# Patient Record
Sex: FEMALE | Race: WHITE | Employment: FULL TIME | URBAN - METROPOLITAN AREA
[De-identification: names, ages, dates, MRNs, and addresses within clinical notes are randomized per-mention and may not be internally consistent; named-entity substitution may affect disease eponyms.]

---

## 2022-10-10 ENCOUNTER — APPOINTMENT (OUTPATIENT)
Dept: GENERAL RADIOLOGY | Age: 22
End: 2022-10-10
Payer: COMMERCIAL

## 2022-10-10 ENCOUNTER — HOSPITAL ENCOUNTER (EMERGENCY)
Age: 22
Discharge: HOME | End: 2022-10-10
Attending: STUDENT IN AN ORGANIZED HEALTH CARE EDUCATION/TRAINING PROGRAM
Payer: COMMERCIAL

## 2022-10-10 VITALS
RESPIRATION RATE: 16 BRPM | TEMPERATURE: 98.3 F | OXYGEN SATURATION: 99 % | DIASTOLIC BLOOD PRESSURE: 88 MMHG | SYSTOLIC BLOOD PRESSURE: 150 MMHG | HEIGHT: 60 IN | BODY MASS INDEX: 24.54 KG/M2 | HEART RATE: 121 BPM | WEIGHT: 125 LBS

## 2022-10-10 DIAGNOSIS — F41.1 ANXIETY STATE: ICD-10-CM

## 2022-10-10 DIAGNOSIS — R07.89 ATYPICAL CHEST PAIN: Primary | ICD-10-CM

## 2022-10-10 LAB
ALBUMIN SERPL-MCNC: 5.1 G/DL (ref 3.5–4.6)
ALP BLD-CCNC: 41 U/L (ref 40–130)
ALT SERPL-CCNC: 27 U/L (ref 0–33)
ANION GAP SERPL CALCULATED.3IONS-SCNC: 13 MEQ/L (ref 9–15)
AST SERPL-CCNC: 24 U/L (ref 0–35)
BACTERIA: ABNORMAL /HPF
BASOPHILS ABSOLUTE: 0 K/UL (ref 0–0.2)
BASOPHILS RELATIVE PERCENT: 0.6 %
BILIRUB SERPL-MCNC: 0.6 MG/DL (ref 0.2–0.7)
BILIRUBIN URINE: NEGATIVE
BLOOD, URINE: NEGATIVE
BUN BLDV-MCNC: 6 MG/DL (ref 6–20)
CALCIUM SERPL-MCNC: 9.2 MG/DL (ref 8.5–9.9)
CHLORIDE BLD-SCNC: 102 MEQ/L (ref 95–107)
CHP ED QC CHECK: NORMAL
CLARITY: CLEAR
CO2: 23 MEQ/L (ref 20–31)
COLOR: YELLOW
CREAT SERPL-MCNC: 0.59 MG/DL (ref 0.5–0.9)
D DIMER: <0.27 MG/L FEU (ref 0–0.5)
EOSINOPHILS ABSOLUTE: 0.1 K/UL (ref 0–0.7)
EOSINOPHILS RELATIVE PERCENT: 1.4 %
EPITHELIAL CELLS, UA: ABNORMAL /HPF (ref 0–5)
GFR AFRICAN AMERICAN: >60
GFR NON-AFRICAN AMERICAN: >60
GLOBULIN: 2.9 G/DL (ref 2.3–3.5)
GLUCOSE BLD-MCNC: 90 MG/DL (ref 70–99)
GLUCOSE URINE: NEGATIVE MG/DL
HCT VFR BLD CALC: 39.6 % (ref 37–47)
HEMOGLOBIN: 13.5 G/DL (ref 12–16)
HYALINE CASTS: ABNORMAL /HPF (ref 0–5)
KETONES, URINE: 15 MG/DL
LACTIC ACID: 1.4 MMOL/L (ref 0.5–2.2)
LEUKOCYTE ESTERASE, URINE: ABNORMAL
LIPASE: 32 U/L (ref 12–95)
LYMPHOCYTES ABSOLUTE: 1.4 K/UL (ref 1–4.8)
LYMPHOCYTES RELATIVE PERCENT: 31.7 %
MAGNESIUM: 1.8 MG/DL (ref 1.7–2.4)
MCH RBC QN AUTO: 29.8 PG (ref 27–31.3)
MCHC RBC AUTO-ENTMCNC: 34 % (ref 33–37)
MCV RBC AUTO: 87.6 FL (ref 82–100)
MONOCYTES ABSOLUTE: 0.3 K/UL (ref 0.2–0.8)
MONOCYTES RELATIVE PERCENT: 6 %
NEUTROPHILS ABSOLUTE: 2.7 K/UL (ref 1.4–6.5)
NEUTROPHILS RELATIVE PERCENT: 60.3 %
NITRITE, URINE: NEGATIVE
PDW BLD-RTO: 12.8 % (ref 11.5–14.5)
PH UA: 7.5 (ref 5–9)
PLATELET # BLD: 273 K/UL (ref 130–400)
POTASSIUM SERPL-SCNC: 3.7 MEQ/L (ref 3.4–4.9)
PREGNANCY TEST URINE, POC: NEGATIVE
PROTEIN UA: NEGATIVE MG/DL
RBC # BLD: 4.52 M/UL (ref 4.2–5.4)
RBC UA: ABNORMAL /HPF (ref 0–5)
SARS-COV-2, NAAT: NOT DETECTED
SODIUM BLD-SCNC: 138 MEQ/L (ref 135–144)
SPECIFIC GRAVITY UA: 1.01 (ref 1–1.03)
TOTAL CK: 68 U/L (ref 0–170)
TOTAL PROTEIN: 8 G/DL (ref 6.3–8)
TROPONIN: <0.01 NG/ML (ref 0–0.01)
TSH REFLEX: 2.35 UIU/ML (ref 0.44–3.86)
URINE REFLEX TO CULTURE: YES
UROBILINOGEN, URINE: 0.2 E.U./DL
WBC # BLD: 4.5 K/UL (ref 4.8–10.8)
WBC UA: ABNORMAL /HPF (ref 0–5)

## 2022-10-10 PROCEDURE — 36415 COLL VENOUS BLD VENIPUNCTURE: CPT

## 2022-10-10 PROCEDURE — 71045 X-RAY EXAM CHEST 1 VIEW: CPT

## 2022-10-10 PROCEDURE — 96361 HYDRATE IV INFUSION ADD-ON: CPT

## 2022-10-10 PROCEDURE — 2500000003 HC RX 250 WO HCPCS: Performed by: STUDENT IN AN ORGANIZED HEALTH CARE EDUCATION/TRAINING PROGRAM

## 2022-10-10 PROCEDURE — 6370000000 HC RX 637 (ALT 250 FOR IP): Performed by: STUDENT IN AN ORGANIZED HEALTH CARE EDUCATION/TRAINING PROGRAM

## 2022-10-10 PROCEDURE — 81001 URINALYSIS AUTO W/SCOPE: CPT

## 2022-10-10 PROCEDURE — 2580000003 HC RX 258: Performed by: STUDENT IN AN ORGANIZED HEALTH CARE EDUCATION/TRAINING PROGRAM

## 2022-10-10 PROCEDURE — 83605 ASSAY OF LACTIC ACID: CPT

## 2022-10-10 PROCEDURE — 84484 ASSAY OF TROPONIN QUANT: CPT

## 2022-10-10 PROCEDURE — 83735 ASSAY OF MAGNESIUM: CPT

## 2022-10-10 PROCEDURE — 99285 EMERGENCY DEPT VISIT HI MDM: CPT

## 2022-10-10 PROCEDURE — 84443 ASSAY THYROID STIM HORMONE: CPT

## 2022-10-10 PROCEDURE — 80053 COMPREHEN METABOLIC PANEL: CPT

## 2022-10-10 PROCEDURE — 83690 ASSAY OF LIPASE: CPT

## 2022-10-10 PROCEDURE — A4216 STERILE WATER/SALINE, 10 ML: HCPCS | Performed by: STUDENT IN AN ORGANIZED HEALTH CARE EDUCATION/TRAINING PROGRAM

## 2022-10-10 PROCEDURE — 96374 THER/PROPH/DIAG INJ IV PUSH: CPT

## 2022-10-10 PROCEDURE — 85379 FIBRIN DEGRADATION QUANT: CPT

## 2022-10-10 PROCEDURE — 82550 ASSAY OF CK (CPK): CPT

## 2022-10-10 PROCEDURE — 85025 COMPLETE CBC W/AUTO DIFF WBC: CPT

## 2022-10-10 PROCEDURE — 87635 SARS-COV-2 COVID-19 AMP PRB: CPT

## 2022-10-10 PROCEDURE — 87086 URINE CULTURE/COLONY COUNT: CPT

## 2022-10-10 PROCEDURE — 86403 PARTICLE AGGLUT ANTBDY SCRN: CPT

## 2022-10-10 PROCEDURE — 93005 ELECTROCARDIOGRAM TRACING: CPT | Performed by: STUDENT IN AN ORGANIZED HEALTH CARE EDUCATION/TRAINING PROGRAM

## 2022-10-10 RX ORDER — 0.9 % SODIUM CHLORIDE 0.9 %
1000 INTRAVENOUS SOLUTION INTRAVENOUS ONCE
Status: COMPLETED | OUTPATIENT
Start: 2022-10-10 | End: 2022-10-10

## 2022-10-10 RX ORDER — ACETAMINOPHEN 500 MG
1000 TABLET ORAL ONCE
Status: COMPLETED | OUTPATIENT
Start: 2022-10-10 | End: 2022-10-10

## 2022-10-10 RX ADMIN — FAMOTIDINE 20 MG: 10 INJECTION, SOLUTION INTRAVENOUS at 10:58

## 2022-10-10 RX ADMIN — SODIUM CHLORIDE 1000 ML: 9 INJECTION, SOLUTION INTRAVENOUS at 11:03

## 2022-10-10 RX ADMIN — LIDOCAINE HYDROCHLORIDE: 20 SOLUTION ORAL; TOPICAL at 10:58

## 2022-10-10 RX ADMIN — ACETAMINOPHEN 1000 MG: 500 TABLET ORAL at 10:57

## 2022-10-10 ASSESSMENT — ENCOUNTER SYMPTOMS
VOMITING: 0
STRIDOR: 0
ABDOMINAL PAIN: 0
SHORTNESS OF BREATH: 1
NAUSEA: 0
WHEEZING: 0
COUGH: 0
EYE PAIN: 0
CHEST TIGHTNESS: 1
BACK PAIN: 1
SORE THROAT: 0
RHINORRHEA: 0
DIARRHEA: 0

## 2022-10-10 ASSESSMENT — PAIN DESCRIPTION - LOCATION: LOCATION: CHEST

## 2022-10-10 ASSESSMENT — PAIN SCALES - GENERAL: PAINLEVEL_OUTOF10: 4

## 2022-10-10 ASSESSMENT — PAIN - FUNCTIONAL ASSESSMENT
PAIN_FUNCTIONAL_ASSESSMENT: ACTIVITIES ARE NOT PREVENTED
PAIN_FUNCTIONAL_ASSESSMENT: 0-10

## 2022-10-10 ASSESSMENT — PAIN DESCRIPTION - FREQUENCY: FREQUENCY: CONTINUOUS

## 2022-10-10 ASSESSMENT — PAIN DESCRIPTION - DESCRIPTORS: DESCRIPTORS: TIGHTNESS;BURNING

## 2022-10-10 ASSESSMENT — PAIN DESCRIPTION - ONSET: ONSET: ON-GOING

## 2022-10-10 ASSESSMENT — PAIN DESCRIPTION - PAIN TYPE: TYPE: ACUTE PAIN

## 2022-10-10 NOTE — ED PROVIDER NOTES
[facility] ED  eMERGENCY dEPARTMENT eNCOUnter      Pt Name: Damian Redd  MRN: [id number]  [place] 2000  Date of evaluation: 10/10/2022  Provider: Araceli Dakin, MD      HISTORY OF PRESENT ILLNESS      Chief Complaint   Patient presents with    Chest Pain     Pt c/o chest pain/tightness that started last night       The history is provided by the Patient. Damian Redd is a 20 y.o. female with a PMH clinically significant for anxiety, ADHD presenting to the ED via EMS c/o chest pain/tightness associated with shortness of breath, fatigue and feeling anxious. Initial onset of symptoms at 5 PM last night. Was just sitting on her couch when symptoms started. Denies any significant preceding exertional activities, trauma or abnormal ingestions. Was putting some objects on high shelves yesterday, but not very strenuous. Characterizes pain as burning, tightness sensation in the sternum. It does seem to also have pain in the back. Denies tearing sensation of pain radiating to the back. States symptoms do seem to be worse with sitting forward. No relief with ibuprofen, antacids taken at home. States that the pain has been constant since initial onset. Does seem to be mildly worse with taking a deep breath. No worsening with cough or palpation. Denies any similar previous episodes. States she does have a history of anxiety and has had issues where anxiety has caused chest pain, but not exactly similar to this. States she is otherwise been feeling well. May be a little bit more fatigued than usual, but no recent illnesses. Does have heavy menses that are regular. Is on oral contraception. Denies any associated F/C/D, sore throat, cough, hemoptysis, abdominal pain, N/V/D/C, urinary symptoms, BLE edema/pain. Denies any history of CAD, DVT/PE or arrhythmias in her or the family. States they have otherwise been feeling well. Taking all medications as indicated.     Per Chart Review: No ill-appearing, toxic-appearing or diaphoretic. HENT:      Head: Normocephalic and atraumatic. Mouth/Throat:      Mouth: Mucous membranes are moist.      Pharynx: Oropharynx is clear. Eyes:      Extraocular Movements: Extraocular movements intact. Pupils: Pupils are equal, round, and reactive to light. Neck:      Thyroid: No thyroid mass, thyromegaly or thyroid tenderness. Trachea: Trachea normal.   Cardiovascular:      Rate and Rhythm: Regular rhythm. Tachycardia present. Pulses: Normal pulses. Heart sounds: Normal heart sounds. Pulmonary:      Effort: Pulmonary effort is normal. No respiratory distress. Breath sounds: Normal breath sounds. Chest:      Chest wall: No deformity, tenderness or crepitus. Abdominal:      General: There is no distension. Palpations: Abdomen is soft. Tenderness: There is no abdominal tenderness. There is no right CVA tenderness, left CVA tenderness, guarding or rebound. Musculoskeletal:         General: No tenderness. Cervical back: Normal range of motion and neck supple. Right lower leg: No edema. Left lower leg: No edema. Skin:     General: Skin is warm and dry. Capillary Refill: Capillary refill takes less than 2 seconds. Neurological:      General: No focal deficit present. Mental Status: She is alert and oriented to person, place, and time. Psychiatric:         Mood and Affect: Mood is anxious.          Behavior: Behavior normal.       MDM:   Chart Reviewed: PMH and additional information as noted in HPI obtained from chart review    Vitals:    Vitals:    10/10/22 1017   BP: (!) 150/88   Pulse: (!) 121   Resp: 16   Temp: 98.3 °F (36.8 °C)   TempSrc: Oral   SpO2: 99%   Weight: 125 lb (56.7 kg)   Height: 5' (1.524 m)       PROCEDURES:  Unless otherwise noted below, none  Procedures    LABS:  Labs Reviewed   CULTURE, URINE - Abnormal; Notable for the following components:       Result Value    Urine Culture, Routine   (*)     Value: Performed at [facility], [address] [place] [address]  [phone]      Organism BHS Group B (Strep agalacticae) (*)     All other components within normal limits    Narrative:     ORDER#: [id number]                          ORDERED BY: CLAUDETTE ROBLES  SOURCE: Urine Clean Catch                  COLLECTED:  10/10/22 10:45  ANTIBIOTICS AT [facility].:                      RECEIVED :  10/10/22 11:16   COMPREHENSIVE METABOLIC PANEL - Abnormal; Notable for the following components:    Albumin 5.1 (*)     All other components within normal limits   CBC WITH AUTO DIFFERENTIAL - Abnormal; Notable for the following components:    WBC 4.5 (*)     All other components within normal limits   URINALYSIS WITH REFLEX TO CULTURE - Abnormal; Notable for the following components:    Ketones, Urine 15 (*)     Leukocyte Esterase, Urine LARGE (*)     All other components within normal limits   MICROSCOPIC URINALYSIS - Abnormal; Notable for the following components:    Bacteria, UA RARE (*)     WBC, UA 10-20 (*)     RBC, UA 3-5 (*)     All other components within normal limits   POCT URINE PREGNANCY - Normal   COVID-19, RAPID   LACTIC ACID   MAGNESIUM   LIPASE   TROPONIN   CK   D-DIMER, QUANTITATIVE   TSH WITH REFLEX       XR CHEST PORTABLE   Final Result   No acute process. ED Course as of 10/12/22 1819   Mon Oct 10, 2022   1127 XR CHEST PORTABLE [NA]   1127 EKG 12 Lead  Sinus tachycardia, rate of 136 bpm.  RAD. Normal intervals. No gross acute ST-T wave abnormalities. [NA]   3118 D-Dimer, Quant: <0.27  Low suspicion for PE/DVT [NA]   1157 TSH: 2.350 [NA]   1158 SARS-CoV-2, NAAT: Not Detected [NA]   1158 Pregnancy, Urine: negative [NA]   1158 Troponin: <0.010  Low suspicion for ACS [NA]   1158 Total CK: 68 [NA]   1158 Lipase: 32  Very low suspicion for pancreatitis.  [NA]   1158 Magnesium: 1.8 [NA]   1158 Comprehensive Metabolic Panel(!):    Sodium 138   Potassium 3.7   Chloride 102 CO2 23   Anion Gap 13   Glucose, Random 90   BUN,BUNPL 6   Creatinine 0.59   GFR Non- >60.0   GFR  >60.0   CALCIUM, SERUM, 110215 9.2   Total Protein 8.0   Albumin 5.1(!)   Bilirubin 0.6   Alk Phos 41   ALT 27   AST 24   Globulin 2.9 [NA]   1158 Lactic Acid: 1.4  Low suspicion for hypoperfusion. [NA]   1158 Epithelial Cells, UA: 6-10  Possibly contaminated and no urinary symptoms. Lower suspicion for UTI [NA]      ED Course User Index  [NA] Debbie Mark MD       20 y.o. female with a PMH clinically significant for anxiety, ADHD presenting to the ED via EMS c/o chest pain/tightness associated with shortness of breath, fatigue and feeling anxious. Upon initial evaluation, Pt tachycardic and anxious appearing, but otherwise Afebrile, HDS and in NAD. PE as noted above. Labs, , EKG,, and Imaging as noted above. Given findings, clinical presentation most likely consistent w/ chest pain thought most likely secondary to anxiety versus musculoskeletal chest pain. Although considered, low suspicion for DVT/PE given negative D-dimer with low pretest probability. Also with low suspicion for ACS, minimal risk factors, nonexertional atypical troponin. No evidence of widened mediastinum on chest x-ray. Symptoms significantly improved in the ED. Heart rate also improved prior to discharge. Strongly encourage patient to follow-up with PCP and return to the ED if any new or worsening symptoms.    Pt was administered   Medications   0.9 % sodium chloride bolus (0 mLs IntraVENous Stopped 10/10/22 1232)   aluminum & magnesium hydroxide-simethicone (MAALOX) 30 mL, lidocaine viscous hcl (XYLOCAINE) 5 mL (GI COCKTAIL) ( Oral Given 10/10/22 1058)   famotidine (PEPCID) 20 mg in sodium chloride (PF) 10 mL injection (20 mg IntraVENous Given 10/10/22 1058)   acetaminophen (TYLENOL) tablet 1,000 mg (1,000 mg Oral Given 10/10/22 1057)       Plan: Discharge home in good condition with meds as noted below and instructions to follow up with PCP . Pt stable and appropriate for further evaluation and management as an outpatient. and Patient understanding and amenable to the POC. CRITICAL CARE TIME   Total CriticalCare time was 0 minutes, excluding separately reportable procedures. There was a high probability of clinically significant/life threatening deterioration in the patient's condition which required my urgent intervention. FINAL IMPRESSION      1. Atypical chest pain    2. Anxiety state          DISPOSITION/PLAN   DISPOSITION Decision To Discharge 10/10/2022 12:19:23 PM      There are no discharge medications for this patient.        MD Dayday Biggs MD  10/12/22 Ariel Zuleta

## 2022-10-10 NOTE — ED TRIAGE NOTES
Pt c/o chest pain/tightness since last night, Pt is A&OX3, calm, ambulatory, afebrile, breathes are equal and unlabored,

## 2022-10-11 LAB
ORGANISM: ABNORMAL
URINE CULTURE, ROUTINE: ABNORMAL
URINE CULTURE, ROUTINE: ABNORMAL

## 2022-10-13 LAB
EKG ATRIAL RATE: 136 BPM
EKG P AXIS: 52 DEGREES
EKG P-R INTERVAL: 134 MS
EKG Q-T INTERVAL: 302 MS
EKG QRS DURATION: 82 MS
EKG QTC CALCULATION (BAZETT): 454 MS
EKG R AXIS: 113 DEGREES
EKG T AXIS: 37 DEGREES
EKG VENTRICULAR RATE: 136 BPM

## 2022-10-25 SDOH — HEALTH STABILITY: PHYSICAL HEALTH: ON AVERAGE, HOW MANY DAYS PER WEEK DO YOU ENGAGE IN MODERATE TO STRENUOUS EXERCISE (LIKE A BRISK WALK)?: 2 DAYS

## 2022-10-25 SDOH — HEALTH STABILITY: PHYSICAL HEALTH: ON AVERAGE, HOW MANY MINUTES DO YOU ENGAGE IN EXERCISE AT THIS LEVEL?: 30 MIN

## 2022-10-28 ENCOUNTER — OFFICE VISIT (OUTPATIENT)
Dept: FAMILY MEDICINE CLINIC | Age: 22
End: 2022-10-28
Payer: COMMERCIAL

## 2022-10-28 VITALS
HEIGHT: 60 IN | TEMPERATURE: 98.6 F | OXYGEN SATURATION: 99 % | SYSTOLIC BLOOD PRESSURE: 124 MMHG | HEART RATE: 111 BPM | DIASTOLIC BLOOD PRESSURE: 85 MMHG | BODY MASS INDEX: 22.34 KG/M2 | WEIGHT: 113.8 LBS

## 2022-10-28 DIAGNOSIS — N92.0 MENORRHAGIA WITH REGULAR CYCLE: ICD-10-CM

## 2022-10-28 DIAGNOSIS — R53.82 CHRONIC FATIGUE: ICD-10-CM

## 2022-10-28 DIAGNOSIS — Z23 NEED FOR INFLUENZA VACCINATION: ICD-10-CM

## 2022-10-28 DIAGNOSIS — Z13.220 ENCOUNTER FOR LIPID SCREENING FOR CARDIOVASCULAR DISEASE: ICD-10-CM

## 2022-10-28 DIAGNOSIS — Z13.6 ENCOUNTER FOR LIPID SCREENING FOR CARDIOVASCULAR DISEASE: ICD-10-CM

## 2022-10-28 DIAGNOSIS — Z76.89 ENCOUNTER TO ESTABLISH CARE: Primary | ICD-10-CM

## 2022-10-28 LAB
ALBUMIN SERPL-MCNC: 4.5 G/DL (ref 3.5–4.6)
ALP BLD-CCNC: 39 U/L (ref 40–130)
ALT SERPL-CCNC: 75 U/L (ref 0–33)
ANION GAP SERPL CALCULATED.3IONS-SCNC: 15 MEQ/L (ref 9–15)
AST SERPL-CCNC: 37 U/L (ref 0–35)
BASOPHILS ABSOLUTE: 0 K/UL (ref 0–0.2)
BASOPHILS RELATIVE PERCENT: 0.7 %
BILIRUB SERPL-MCNC: 0.5 MG/DL (ref 0.2–0.7)
BUN BLDV-MCNC: 7 MG/DL (ref 6–20)
CALCIUM SERPL-MCNC: 9.4 MG/DL (ref 8.5–9.9)
CHLORIDE BLD-SCNC: 103 MEQ/L (ref 95–107)
CHOLESTEROL, FASTING: 190 MG/DL (ref 0–199)
CO2: 22 MEQ/L (ref 20–31)
CREAT SERPL-MCNC: 0.54 MG/DL (ref 0.5–0.9)
EOSINOPHILS ABSOLUTE: 0.1 K/UL (ref 0–0.7)
EOSINOPHILS RELATIVE PERCENT: 2.1 %
GFR SERPL CREATININE-BSD FRML MDRD: >60 ML/MIN/{1.73_M2}
GLOBULIN: 3.2 G/DL (ref 2.3–3.5)
GLUCOSE BLD-MCNC: 87 MG/DL (ref 70–99)
HCT VFR BLD CALC: 40.7 % (ref 37–47)
HDLC SERPL-MCNC: 46 MG/DL (ref 40–59)
HEMOGLOBIN: 13.9 G/DL (ref 12–16)
LDL CHOLESTEROL CALCULATED: 126 MG/DL (ref 0–129)
LYMPHOCYTES ABSOLUTE: 1.7 K/UL (ref 1–4.8)
LYMPHOCYTES RELATIVE PERCENT: 34.6 %
MCH RBC QN AUTO: 30.3 PG (ref 27–31.3)
MCHC RBC AUTO-ENTMCNC: 34.2 % (ref 33–37)
MCV RBC AUTO: 88.7 FL (ref 79.4–94.8)
MONOCYTES ABSOLUTE: 0.3 K/UL (ref 0.2–0.8)
MONOCYTES RELATIVE PERCENT: 6.9 %
NEUTROPHILS ABSOLUTE: 2.8 K/UL (ref 1.4–6.5)
NEUTROPHILS RELATIVE PERCENT: 55.7 %
PDW BLD-RTO: 13.2 % (ref 11.5–14.5)
PLATELET # BLD: 316 K/UL (ref 130–400)
POTASSIUM SERPL-SCNC: 4.4 MEQ/L (ref 3.4–4.9)
RBC # BLD: 4.59 M/UL (ref 4.2–5.4)
SODIUM BLD-SCNC: 140 MEQ/L (ref 135–144)
TOTAL PROTEIN: 7.7 G/DL (ref 6.3–8)
TRIGLYCERIDE, FASTING: 90 MG/DL (ref 0–150)
WBC # BLD: 5 K/UL (ref 4.8–10.8)

## 2022-10-28 PROCEDURE — 1036F TOBACCO NON-USER: CPT | Performed by: STUDENT IN AN ORGANIZED HEALTH CARE EDUCATION/TRAINING PROGRAM

## 2022-10-28 PROCEDURE — G8427 DOCREV CUR MEDS BY ELIG CLIN: HCPCS | Performed by: STUDENT IN AN ORGANIZED HEALTH CARE EDUCATION/TRAINING PROGRAM

## 2022-10-28 PROCEDURE — 99203 OFFICE O/P NEW LOW 30 MIN: CPT | Performed by: STUDENT IN AN ORGANIZED HEALTH CARE EDUCATION/TRAINING PROGRAM

## 2022-10-28 PROCEDURE — G8482 FLU IMMUNIZE ORDER/ADMIN: HCPCS | Performed by: STUDENT IN AN ORGANIZED HEALTH CARE EDUCATION/TRAINING PROGRAM

## 2022-10-28 PROCEDURE — 90674 CCIIV4 VAC NO PRSV 0.5 ML IM: CPT | Performed by: STUDENT IN AN ORGANIZED HEALTH CARE EDUCATION/TRAINING PROGRAM

## 2022-10-28 PROCEDURE — G8420 CALC BMI NORM PARAMETERS: HCPCS | Performed by: STUDENT IN AN ORGANIZED HEALTH CARE EDUCATION/TRAINING PROGRAM

## 2022-10-28 PROCEDURE — 90471 IMMUNIZATION ADMIN: CPT | Performed by: STUDENT IN AN ORGANIZED HEALTH CARE EDUCATION/TRAINING PROGRAM

## 2022-10-28 RX ORDER — GABAPENTIN 300 MG/1
CAPSULE ORAL
COMMUNITY
Start: 2022-10-03

## 2022-10-28 RX ORDER — CHOLECALCIFEROL (VITAMIN D3) 50 MCG
TABLET ORAL
COMMUNITY

## 2022-10-28 RX ORDER — NORETHINDRONE ACETATE AND ETHINYL ESTRADIOL 1; .02 MG/1; MG/1
TABLET ORAL
COMMUNITY
End: 2022-10-28 | Stop reason: SDUPTHER

## 2022-10-28 RX ORDER — DEXTROAMPHETAMINE SACCHARATE, AMPHETAMINE ASPARTATE MONOHYDRATE, DEXTROAMPHETAMINE SULFATE AND AMPHETAMINE SULFATE 6.25; 6.25; 6.25; 6.25 MG/1; MG/1; MG/1; MG/1
CAPSULE, EXTENDED RELEASE ORAL
COMMUNITY
Start: 2022-10-03

## 2022-10-28 RX ORDER — NORETHINDRONE ACETATE AND ETHINYL ESTRADIOL 1; .02 MG/1; MG/1
1 TABLET ORAL DAILY
Qty: 3 PACKET | Refills: 3 | Status: SHIPPED | OUTPATIENT
Start: 2022-10-28

## 2022-10-28 SDOH — ECONOMIC STABILITY: TRANSPORTATION INSECURITY
IN THE PAST 12 MONTHS, HAS LACK OF TRANSPORTATION KEPT YOU FROM MEETINGS, WORK, OR FROM GETTING THINGS NEEDED FOR DAILY LIVING?: NO

## 2022-10-28 SDOH — ECONOMIC STABILITY: FOOD INSECURITY: WITHIN THE PAST 12 MONTHS, YOU WORRIED THAT YOUR FOOD WOULD RUN OUT BEFORE YOU GOT MONEY TO BUY MORE.: NEVER TRUE

## 2022-10-28 SDOH — ECONOMIC STABILITY: TRANSPORTATION INSECURITY
IN THE PAST 12 MONTHS, HAS THE LACK OF TRANSPORTATION KEPT YOU FROM MEDICAL APPOINTMENTS OR FROM GETTING MEDICATIONS?: NO

## 2022-10-28 SDOH — ECONOMIC STABILITY: FOOD INSECURITY: WITHIN THE PAST 12 MONTHS, THE FOOD YOU BOUGHT JUST DIDN'T LAST AND YOU DIDN'T HAVE MONEY TO GET MORE.: NEVER TRUE

## 2022-10-28 ASSESSMENT — PATIENT HEALTH QUESTIONNAIRE - PHQ9
9. THOUGHTS THAT YOU WOULD BE BETTER OFF DEAD, OR OF HURTING YOURSELF: 0
SUM OF ALL RESPONSES TO PHQ QUESTIONS 1-9: 13
SUM OF ALL RESPONSES TO PHQ QUESTIONS 1-9: 13
3. TROUBLE FALLING OR STAYING ASLEEP: 1
5. POOR APPETITE OR OVEREATING: 1
7. TROUBLE CONCENTRATING ON THINGS, SUCH AS READING THE NEWSPAPER OR WATCHING TELEVISION: 3
2. FEELING DOWN, DEPRESSED OR HOPELESS: 2
8. MOVING OR SPEAKING SO SLOWLY THAT OTHER PEOPLE COULD HAVE NOTICED. OR THE OPPOSITE, BEING SO FIGETY OR RESTLESS THAT YOU HAVE BEEN MOVING AROUND A LOT MORE THAN USUAL: 0
1. LITTLE INTEREST OR PLEASURE IN DOING THINGS: 2
4. FEELING TIRED OR HAVING LITTLE ENERGY: 3
SUM OF ALL RESPONSES TO PHQ9 QUESTIONS 1 & 2: 4
SUM OF ALL RESPONSES TO PHQ QUESTIONS 1-9: 13
6. FEELING BAD ABOUT YOURSELF - OR THAT YOU ARE A FAILURE OR HAVE LET YOURSELF OR YOUR FAMILY DOWN: 1
10. IF YOU CHECKED OFF ANY PROBLEMS, HOW DIFFICULT HAVE THESE PROBLEMS MADE IT FOR YOU TO DO YOUR WORK, TAKE CARE OF THINGS AT HOME, OR GET ALONG WITH OTHER PEOPLE: 1
SUM OF ALL RESPONSES TO PHQ QUESTIONS 1-9: 13

## 2022-10-28 ASSESSMENT — ENCOUNTER SYMPTOMS
SORE THROAT: 0
COUGH: 0
RHINORRHEA: 0
SHORTNESS OF BREATH: 0
VOMITING: 0
WHEEZING: 0
ABDOMINAL PAIN: 0
EYE DISCHARGE: 0
DIARRHEA: 0
NAUSEA: 0

## 2022-10-28 ASSESSMENT — SOCIAL DETERMINANTS OF HEALTH (SDOH): HOW HARD IS IT FOR YOU TO PAY FOR THE VERY BASICS LIKE FOOD, HOUSING, MEDICAL CARE, AND HEATING?: NOT HARD AT ALL

## 2022-10-28 NOTE — PROGRESS NOTES
Subjective  Gerson Pimentel, 20 y.o. female presents today with:  Chief Complaint   Patient presents with    Establish Care     Was seeing Dr. Zelda Self prior. Is now living in [id number] [place] [address] working in [place] so wanted a PCP in this area. Also was seeing Dr. Jonnathan Owusu, but has switched to [address] at [facility] in [place]. Fatigue     States she has been a vegetarian for sometime now & would like to see about having some levels checked such as Vitamin B12 because she has been very fatigued lately. HPI    Patient with appointment to establish care. She was previously seeing Dr. Arturo Che. She recently moved to the area. Patient with history of ADHD and depression. She does follow with psychiatry. She is on Adderall XR 25 mg daily and gabapentin 300 mg. Tolerating well. No side effects. She feels that her symptoms are under good control. Patient also with chronic fatigue. This been an ongoing issue. She has been a vegetarian for a number of years and is concerned about her B12 levels. She states that she just feels tired most of the time. She states that she will have to nap at times when coming home from work. She states it did get better when he recently increased her dose of Adderall, however, she does feel like she is just exhausted. Patient also with menorrhagia with regular cycle and desire for birth control. She is on OCPs currently. She would like a refill of these. She has not missed any doses. She does still have some pills left. She just needs a refill. She would like a flu shot today. She has no other concerns at this time. Review of Systems   Constitutional:  Positive for fatigue. Negative for chills, fever and unexpected weight change. HENT:  Negative for congestion, rhinorrhea and sore throat. Eyes:  Negative for discharge. Respiratory:  Negative for cough, shortness of breath and wheezing.     Cardiovascular:  Negative for chest pain, palpitations and leg swelling. Gastrointestinal:  Negative for abdominal pain, diarrhea, nausea and vomiting. Genitourinary:  Positive for menstrual problem. Negative for difficulty urinating. Musculoskeletal:  Negative for arthralgias and joint swelling. Skin:  Negative for rash. Neurological:  Negative for weakness, light-headedness, numbness and headaches. Psychiatric/Behavioral:  Negative for confusion. The patient is not nervous/anxious. Past Medical History:   Diagnosis Date    ADHD (attention deficit hyperactivity disorder) 2012    Diagnosed 2021    Anxiety 2012    Depression 2012    GERD (gastroesophageal reflux disease)     TMJ (dislocation of temporomandibular joint)      Past Surgical History:   Procedure Laterality Date    TONSILLECTOMY  2004     Current Outpatient Medications   Medication Sig Dispense Refill    gabapentin (NEURONTIN) 300 MG capsule TAKE 1 CAPSULE BY MOUTH THREE TIMES DAILY      amphetamine-dextroamphetamine (ADDERALL XR) 25 MG extended release capsule TAKE 1 CAPSULE BY MOUTH ONCE DAILY IN THE MORNING      vitamin D (CHOLECALCIFEROL) 50 MCG (2000 UT) TABS tablet       norethindrone-ethinyl estradiol (MICROGESTIN 1/20) 1-20 MG-MCG per tablet Take 1 tablet by mouth daily 3 packet 3     No current facility-administered medications for this visit. PMH, Surgical Hx, Family Hx, and Social Hx reviewed and updated. Health Maintenance reviewed. Objective    Vitals:    10/28/22 0806   BP: 124/85   Pulse: (!) 111   Temp: 98.6 °F (37 °C)   SpO2: 99%   Weight: 113 lb 12.8 oz (51.6 kg)   Height: 5' (1.524 m)       Physical Exam  Vitals reviewed. Constitutional:       General: She is not in acute distress. HENT:      Head: Normocephalic and atraumatic. Eyes:      Conjunctiva/sclera: Conjunctivae normal.   Neck:      Thyroid: No thyromegaly or thyroid tenderness. Cardiovascular:      Rate and Rhythm: Normal rate and regular rhythm. Heart sounds: No murmur heard. No friction rub.  No gallop. Pulmonary:      Effort: Pulmonary effort is normal.      Breath sounds: Normal breath sounds. No wheezing, rhonchi or rales. Musculoskeletal:         General: No swelling or deformity. Cervical back: Normal range of motion and neck supple. Right lower leg: No edema. Left lower leg: No edema. Lymphadenopathy:      Cervical: No cervical adenopathy. Skin:     General: Skin is warm and dry. Findings: No rash. Neurological:      General: No focal deficit present. Mental Status: She is alert. Gait: Gait is intact. Psychiatric:         Mood and Affect: Mood normal.         Behavior: Behavior normal.          Assessment & Plan       1. Encounter to establish care  Patient with appointment to establish care with new physician. She was previously being seen elsewhere. 2. Chronic fatigue  Patient with chronic fatigue. Will obtain labs including B12 and folate, vitamin D, CBC and CMP. Call with results when return. Continue to monitor. Follow-up as scheduled. - Vitamin B12 & Folate; Future  - Vitamin D 25 Hydroxy; Future  - CBC with Auto Differential; Future  - Comprehensive Metabolic Panel; Future    3. Menorrhagia with regular cycle  Stable, chronic. Continue OCPs. Refill sent to the pharmacy. - norethindrone-ethinyl estradiol (MICROGESTIN 1/20) 1-20 MG-MCG per tablet; Take 1 tablet by mouth daily  Dispense: 3 packet; Refill: 3    4. Need for influenza vaccination  Flu shot given  - Influenza, FLUCELVAX, (age 10 mo+), IM, Preservative Free, 0.5 mL    5. Encounter for lipid screening for cardiovascular disease  Lipid panel ordered  - Lipid, Fasting;  Future  Orders Placed This Encounter   Procedures    Influenza, FLUCELVAX, (age 10 mo+), IM, Preservative Free, 0.5 mL    Vitamin B12 & Folate     Standing Status:   Future     Number of Occurrences:   1     Standing Expiration Date:   10/28/2023    Vitamin D 25 Hydroxy     Standing Status:   Future     Number of Occurrences:   1     Standing Expiration Date:   10/28/2023    CBC with Auto Differential     Standing Status:   Future     Number of Occurrences:   1     Standing Expiration Date:   10/28/2023    Lipid, Fasting     Standing Status:   Future     Number of Occurrences:   1     Standing Expiration Date:   10/28/2023    Comprehensive Metabolic Panel     Standing Status:   Future     Number of Occurrences:   1     Standing Expiration Date:   10/28/2023       Orders Placed This Encounter   Medications    norethindrone-ethinyl estradiol (MICROGESTIN 1/20) 1-20 MG-MCG per tablet     Sig: Take 1 tablet by mouth daily     Dispense:  3 packet     Refill:  3       Medications Discontinued During This Encounter   Medication Reason    norethindrone-ethinyl estradiol (MICROGESTIN 1/20) 1-20 MG-MCG per tablet REORDER     Return in about 2 weeks (around 11/11/2022) for wwe with pap. Reviewed with the patient: current clinical status,medications, activities and diet. Side effects, adverse effects of themedication prescribed today, as well as treatment plan/ rationale and result expectations have been discussed with the patient who expresses understanding and desires to proceed. Close follow up to evaluate treatment results and for coordination of care. I have reviewed the patient's medical history in detail and updated the computerized patient record. Please note, this report has been partially produced using speech recognition software and may cause  and /or contain errors related to that system including grammar, punctuation and spelling as well as words and phrases that may seem inappropriate. If there are questions or concerns please feel free to contact me to clarify.     Rianna Rick, DO

## 2022-10-28 NOTE — PROGRESS NOTES
Vaccine Information Sheet, \"Influenza - Inactivated\"  given to Marianen Miller, or parent/legal guardian of  Marianne Miller and verbalized understanding. Patient responses:    Have you ever had a reaction to a flu vaccine? No  Are you able to eat eggs without adverse effects? Yes  Do you have any current illness? No  Have you ever had Guillian Dubberly Syndrome? No    Flu vaccine given per order. Please see immunization tab.

## 2022-10-29 LAB
FOLATE: >20 NG/ML
VITAMIN B-12: 337 PG/ML (ref 232–1245)
VITAMIN D 25-HYDROXY: 64.2 NG/ML

## 2022-11-07 NOTE — RESULT ENCOUNTER NOTE
Please let patient know that her B12 and folate levels were normal.  B12 is on the slightly low end of normal.  Vitamin D level was normal.  Metabolic panel essentially normal except for slightly elevated liver enzymes. We can monitor these. No need for further work-up at this time. Her cholesterol levels were normal.  Her CBC was normal.  No signs of anemia. Follow-up as scheduled.   Thanks

## 2022-11-16 ENCOUNTER — TELEPHONE (OUTPATIENT)
Dept: FAMILY MEDICINE CLINIC | Age: 22
End: 2022-11-16

## 2022-11-16 NOTE — TELEPHONE ENCOUNTER
Called pt asking for her to call back. Pt may need to make the appointment with a different provider due to no available slots on the pt's available days. (11/23, 12/5, 12/12).

## 2022-11-16 NOTE — TELEPHONE ENCOUNTER
----- Message from Artur Mays sent at 11/16/2022  2:02 PM EST -----  Subject: Message to Provider    QUESTIONS  Information for Provider? pt is requesting a PAP appt during the afternoon   of Nov 23, 2022 or week of Dec 5, or week of Dec 12, 2022.   ---------------------------------------------------------------------------  --------------  Monie Hart INFO  [phone]; OK to leave message on [employer], OK to respond with   electronic message via [employer] portal (only for patients who have   registered [employer] account)  ---------------------------------------------------------------------------  --------------  SCRIPT ANSWERS  Relationship to Patient? Self  Have your symptoms changed? No  (If the patient has Medicare as their primary insurance coverage ask this   question) Are you requesting a Medicare Annual Wellness Visit? No  (Is the patient requesting a pap smear with their physical exam?)? No  (Is the patient requesting their annual physical and does not need PAP or   AWV per above?)? No  (Is the patient requesting to see the provider for a procedure?)?  Yes

## 2023-01-12 ENCOUNTER — NURSE TRIAGE (OUTPATIENT)
Dept: OTHER | Facility: CLINIC | Age: 23
End: 2023-01-12

## 2023-01-12 NOTE — TELEPHONE ENCOUNTER
Location of patient: [address] [facility] [email or web address] [facility] [employer]. Subjective: Caller states \"I have been dizzy\"     Current Symptoms: Dizziness and lightheaded on and off. Tunnel vision recently. No passing out. Last episode was x5 days ago. No dizziness at time of call. For the last 2 weeks, gets a numbness and tingling sensation, on lower part of both sides of face, happening now for the last couple of hours and comes and goes. Has TMJ. Floaters and a static overlay on and off in her vision. Fatigue, takes Adderall and is still tired. Knees turn reddish/purple just while standing and gets splotches on her feet. When she is in the shower her feet are turning blue. After she shaves, the hair grows back in patches. Intermittent calf pain for a couple of months. Has been seen in ER on 10/10/22 for chest pain and still happens intermittently. Onset: several years ago; intermittent, worsening    Associated Symptoms: NA    Pain Severity: 0/10    Temperature: denies fever     What has been tried: layed down    LMP: NA Pregnant: No    Recommended disposition: Go to ED/[facility] Now (Or to Office with PCP Approval). Care advice provided, patient verbalizes understanding; denies any other questions or concerns; instructed to call back for any new or worsening symptoms. Unable to reach PCP staff, phone rang for several minutes and then went to silence. Advised will send message to PCP office as a high priority call and she will get a call back. Transferred to [facility], as she wants to go ahead and book an appointment to be seen. Attention Provider: Thank you for allowing me to participate in the care of your patient. The patient was connected to triage in response to information provided to the ECC/PSC. Please do not respond through this encounter as the response is not directed to a shared pool.     Reason for Disposition   Patient wants to be seen   Patient sounds very sick or weak to the triager    Protocols used: Dizziness-ADULT-[place]

## 2023-01-16 ENCOUNTER — OFFICE VISIT (OUTPATIENT)
Dept: FAMILY MEDICINE CLINIC | Age: 23
End: 2023-01-16
Payer: COMMERCIAL

## 2023-01-16 VITALS
SYSTOLIC BLOOD PRESSURE: 133 MMHG | BODY MASS INDEX: 22.65 KG/M2 | HEART RATE: 127 BPM | HEIGHT: 60 IN | TEMPERATURE: 98.2 F | OXYGEN SATURATION: 99 % | WEIGHT: 115.4 LBS | DIASTOLIC BLOOD PRESSURE: 82 MMHG

## 2023-01-16 DIAGNOSIS — I87.2 VENOUS INSUFFICIENCY: ICD-10-CM

## 2023-01-16 DIAGNOSIS — L81.9 DISCOLORATION OF SKIN OF LOWER LEG: ICD-10-CM

## 2023-01-16 DIAGNOSIS — R00.2 PALPITATIONS: ICD-10-CM

## 2023-01-16 DIAGNOSIS — L81.9 DISCOLORATION OF SKIN OF LOWER LEG: Primary | ICD-10-CM

## 2023-01-16 LAB
ALBUMIN SERPL-MCNC: 4.2 G/DL (ref 3.5–4.6)
ALP BLD-CCNC: 37 U/L (ref 40–130)
ALT SERPL-CCNC: 42 U/L (ref 0–33)
ANION GAP SERPL CALCULATED.3IONS-SCNC: 15 MEQ/L (ref 9–15)
AST SERPL-CCNC: 22 U/L (ref 0–35)
BASOPHILS ABSOLUTE: 0 K/UL (ref 0–0.2)
BASOPHILS RELATIVE PERCENT: 0.4 %
BILIRUB SERPL-MCNC: 0.3 MG/DL (ref 0.2–0.7)
BUN BLDV-MCNC: 4 MG/DL (ref 6–20)
CALCIUM SERPL-MCNC: 9.2 MG/DL (ref 8.5–9.9)
CHLORIDE BLD-SCNC: 103 MEQ/L (ref 95–107)
CO2: 24 MEQ/L (ref 20–31)
CREAT SERPL-MCNC: 0.45 MG/DL (ref 0.5–0.9)
EOSINOPHILS ABSOLUTE: 0 K/UL (ref 0–0.7)
EOSINOPHILS RELATIVE PERCENT: 0.7 %
GFR SERPL CREATININE-BSD FRML MDRD: >60 ML/MIN/{1.73_M2}
GLOBULIN: 3.1 G/DL (ref 2.3–3.5)
GLUCOSE BLD-MCNC: 109 MG/DL (ref 70–99)
HCT VFR BLD CALC: 39 % (ref 37–47)
HEMOGLOBIN: 13.2 G/DL (ref 12–16)
LYMPHOCYTES ABSOLUTE: 1.7 K/UL (ref 1–4.8)
LYMPHOCYTES RELATIVE PERCENT: 31.7 %
MAGNESIUM: 2 MG/DL (ref 1.7–2.4)
MCH RBC QN AUTO: 29.6 PG (ref 27–31.3)
MCHC RBC AUTO-ENTMCNC: 33.7 % (ref 33–37)
MCV RBC AUTO: 87.8 FL (ref 79.4–94.8)
MONOCYTES ABSOLUTE: 0.3 K/UL (ref 0.2–0.8)
MONOCYTES RELATIVE PERCENT: 5.7 %
NEUTROPHILS ABSOLUTE: 3.4 K/UL (ref 1.4–6.5)
NEUTROPHILS RELATIVE PERCENT: 61.5 %
PDW BLD-RTO: 13.3 % (ref 11.5–14.5)
PLATELET # BLD: 299 K/UL (ref 130–400)
POTASSIUM SERPL-SCNC: 3.8 MEQ/L (ref 3.4–4.9)
RBC # BLD: 4.45 M/UL (ref 4.2–5.4)
SODIUM BLD-SCNC: 142 MEQ/L (ref 135–144)
TOTAL PROTEIN: 7.3 G/DL (ref 6.3–8)
TSH SERPL DL<=0.05 MIU/L-ACNC: 1.21 UIU/ML (ref 0.44–3.86)
WBC # BLD: 5.5 K/UL (ref 4.8–10.8)

## 2023-01-16 PROCEDURE — 99214 OFFICE O/P EST MOD 30 MIN: CPT | Performed by: NURSE PRACTITIONER

## 2023-01-16 PROCEDURE — G8482 FLU IMMUNIZE ORDER/ADMIN: HCPCS | Performed by: NURSE PRACTITIONER

## 2023-01-16 PROCEDURE — G8420 CALC BMI NORM PARAMETERS: HCPCS | Performed by: NURSE PRACTITIONER

## 2023-01-16 PROCEDURE — G8427 DOCREV CUR MEDS BY ELIG CLIN: HCPCS | Performed by: NURSE PRACTITIONER

## 2023-01-16 PROCEDURE — 1036F TOBACCO NON-USER: CPT | Performed by: NURSE PRACTITIONER

## 2023-01-16 ASSESSMENT — PATIENT HEALTH QUESTIONNAIRE - PHQ9
5. POOR APPETITE OR OVEREATING: 1
10. IF YOU CHECKED OFF ANY PROBLEMS, HOW DIFFICULT HAVE THESE PROBLEMS MADE IT FOR YOU TO DO YOUR WORK, TAKE CARE OF THINGS AT HOME, OR GET ALONG WITH OTHER PEOPLE: 1
9. THOUGHTS THAT YOU WOULD BE BETTER OFF DEAD, OR OF HURTING YOURSELF: 0
7. TROUBLE CONCENTRATING ON THINGS, SUCH AS READING THE NEWSPAPER OR WATCHING TELEVISION: 2
SUM OF ALL RESPONSES TO PHQ QUESTIONS 1-9: 12
2. FEELING DOWN, DEPRESSED OR HOPELESS: 2
SUM OF ALL RESPONSES TO PHQ QUESTIONS 1-9: 12
1. LITTLE INTEREST OR PLEASURE IN DOING THINGS: 2
4. FEELING TIRED OR HAVING LITTLE ENERGY: 3
3. TROUBLE FALLING OR STAYING ASLEEP: 0
SUM OF ALL RESPONSES TO PHQ9 QUESTIONS 1 & 2: 4
8. MOVING OR SPEAKING SO SLOWLY THAT OTHER PEOPLE COULD HAVE NOTICED. OR THE OPPOSITE, BEING SO FIGETY OR RESTLESS THAT YOU HAVE BEEN MOVING AROUND A LOT MORE THAN USUAL: 1
6. FEELING BAD ABOUT YOURSELF - OR THAT YOU ARE A FAILURE OR HAVE LET YOURSELF OR YOUR FAMILY DOWN: 1

## 2023-01-16 NOTE — PROGRESS NOTES
Subjective:      Patient ID: Patricia Miner is a 20 y.o. female who presents today for:     Chief Complaint   Patient presents with    Dizziness     Patient presents today c/o dizziness. Patient states about 2 weeks ago she was in bathroom and got dizzy and lightheaded. Patient states she felt like she was going to pass out but didn't. Patient states it has not happened again. Numbness     Patient c/o bilateral foot numbness. Patient states her legs and feet are discolored. Eye Problem     Patient c/o floaters x 1 year. HPI Pt in today with concern for numbness and discoloration of bilateral lower legs for about the last 2 months. She reports she firs tnoticed it about 2 months ago in the shower and she adjusted the temp of her water and it has continued. It also happens now when she is standing for a longer amount of time. She reports that it gets purple and mottles. She reports intermittent mild pain. She reports she had an episode of dizziness about 2 weeks ago. Felt a little nauseated and got very nauseated and felt like she was going to pass out. She reports her HR was very fast. She reports that she laid down for a bit. She does occasionally get palpitations but not with the near syncope typically.      Past Medical History:   Diagnosis Date    ADHD (attention deficit hyperactivity disorder) 2012    Diagnosed 2021    Anxiety 2012    Depression 2012    GERD (gastroesophageal reflux disease)     TMJ (dislocation of temporomandibular joint)      Past Surgical History:   Procedure Laterality Date    TONSILLECTOMY  2004     Family History   Problem Relation Age of Onset    Depression Mother     Mental Illness Mother     Osteoporosis Mother     Vision Loss Mother     Alcohol Abuse Father     Depression Father     Mental Illness Father     Substance Abuse Father     Vision Loss Father     Alcohol Abuse Brother     Depression Brother     Mental Illness Brother     Substance Abuse Brother     Vision Loss Brother     Depression Brother     Mental Illness Brother     Vision Loss Brother     Depression Brother     Mental Illness Brother     Vision Loss Brother     Depression Brother     Mental Illness Brother     Vision Loss Brother     Alcohol Abuse Maternal Grandfather     Cancer Maternal Grandfather     Heart Attack Maternal Grandfather     Early Death Paternal Grandfather         Aneurysm     Social History     Socioeconomic History    Marital status: Life Partner     Spouse name: Not on file    Number of children: Not on file    Years of education: Not on file    Highest education level: Not on file   Occupational History    Not on file   Tobacco Use    Smoking status: Never    Smokeless tobacco: Never   Vaping Use    Vaping Use: Never used   Substance and Sexual Activity    Alcohol use: Not Currently    Drug use: Never    Sexual activity: Yes     Partners: Male     Comment: On birth control   Other Topics Concern    Not on file   Social History Narrative    Not on file     Social Determinants of Health     Financial Resource Strain: Low Risk     Difficulty of Paying Living Expenses: Not hard at all   Food Insecurity: No Food Insecurity    Worried About [address] [employer] [employer] in the Last Year: Never true    [address] [employer] in the Last Year: Never true   Transportation Needs: No Transportation Needs    Lack of Transportation (Medical): No    Lack of Transportation (Non-Medical):  No   Physical Activity: Insufficiently Active    Days of Exercise per Week: 2 days    Minutes of Exercise per Session: 30 min   Stress: Not on file   Social Connections: Not on file   Intimate Partner Violence: Not At Risk    Fear of Current or Ex-Partner: No    Emotionally Abused: No    Physically Abused: No    Sexually Abused: No   Housing Stability: Not on file     Current Outpatient Medications on File Prior to Visit   Medication Sig Dispense Refill    gabapentin (NEURONTIN) 300 MG capsule TAKE 1 CAPSULE BY MOUTH THREE TIMES DAILY amphetamine-dextroamphetamine (ADDERALL XR) 25 MG extended release capsule TAKE 1 CAPSULE BY MOUTH ONCE DAILY IN THE MORNING      vitamin D (CHOLECALCIFEROL) 50 MCG (2000 UT) TABS tablet       norethindrone-ethinyl estradiol (MICROGESTIN 1/20) 1-20 MG-MCG per tablet Take 1 tablet by mouth daily 3 packet 3     No current facility-administered medications on file prior to visit. Allergies:  Serotonin reuptake inhibitors (ssris)    Review of Systems   Constitutional:  Negative for chills, fatigue and fever. HENT:  Negative for congestion. Respiratory:  Negative for cough, shortness of breath and wheezing. Cardiovascular:  Positive for palpitations. Negative for chest pain and leg swelling. Gastrointestinal:  Negative for abdominal pain. Genitourinary:  Negative for difficulty urinating. Neurological:  Positive for dizziness and light-headedness. Negative for headaches. Objective:   /82 (Site: Right Upper Arm, Position: Sitting, Cuff Size: Medium Adult)   Pulse (!) 127   Temp 98.2 °F (36.8 °C) (Temporal)   Ht 5' (1.524 m)   Wt 115 lb 6.4 oz (52.3 kg)   SpO2 99%   BMI 22.54 kg/m²     Physical Exam  Constitutional:       Appearance: She is well-developed. HENT:      Head: Normocephalic. Right Ear: External ear normal.      Left Ear: External ear normal.      Nose: Nose normal.      Mouth/Throat:      Mouth: Mucous membranes are moist.      Pharynx: Oropharynx is clear. Eyes:      Conjunctiva/sclera: Conjunctivae normal.   Cardiovascular:      Rate and Rhythm: Normal rate and regular rhythm. Heart sounds: Normal heart sounds. Pulmonary:      Effort: Pulmonary effort is normal.      Breath sounds: Normal breath sounds. Musculoskeletal:         General: Normal range of motion. Cervical back: Normal range of motion. Skin:     General: Skin is warm and dry. Neurological:      Mental Status: She is alert and oriented to person, place, and time.    Psychiatric: Mood and Affect: Mood normal.         Behavior: Behavior normal.       Assessment:          Diagnosis Orders   1. Discoloration of skin of lower leg  Comprehensive Metabolic Panel    [facility] - Pedro Ayala DO, Invasive Cardiology, [place]      2. Venous insufficiency  Comprehensive Metabolic Panel    [facility] - Pedro Ayala [place], Invasive Cardiology, [place]      3. Palpitations  TSH    Vitamin B12    CBC with Auto Differential    Pedro Engle DO, Invasive Cardiology, [place]    Magnesium          Plan:      Orders Placed This Encounter   Procedures    Comprehensive Metabolic Panel     Standing Status:   Future     Number of Occurrences:   1     Standing Expiration Date:   1/16/2024    TSH     Standing Status:   Future     Number of Occurrences:   1     Standing Expiration Date:   1/16/2024    Vitamin B12     Standing Status:   Future     Number of Occurrences:   1     Standing Expiration Date:   1/16/2024    CBC with Auto Differential     Standing Status:   Future     Number of Occurrences:   1     Standing Expiration Date:   1/16/2024    Magnesium     Standing Status:   Future     Number of Occurrences:   1     Standing Expiration Date:   1/16/2024    Pedro Mckeon DO, Invasive Cardiology, [place]     Referral Priority:   Routine     Referral Type:   Eval and Treat     Referral Reason:   Specialty Services Required     Referred to Provider:   Elisabeth Ayoub DO     Requested Specialty:   Cardiology     Number of Visits Requested:   1        No orders of the defined types were placed in this encounter. 1. Discoloration of skin of lower leg  Possibly circulatory in nature? Given cardiac concerns with palpitations and tachycardia will have follow-up with cardiology for further evaluation of circulatory issues. - Comprehensive Metabolic Panel; Future  - Pedro Mckeon DO, Invasive Cardiology, [place]    2. Venous insufficiency    - Comprehensive Metabolic Panel;  Future  - Pedro Mckeon [place], Invasive Cardiology, [place]    3. Palpitations    - TSH; Future  - Vitamin B12; Future  - CBC with Auto Differential; Future  - [facility], [place], [place], Invasive Cardiology, [place]  - Magnesium; Future      Return in about 3 months (around 4/16/2023). Reviewed with the patient: current clinicalstatus, medications, activities and diet. Side effects, adverse effects of the medication prescribedtoday, as well as treatment plan/ rationale and result expectations have been discussedwith the patient who expresses understanding and desires to proceed. Close follow upto evaluate treatment results and for coordination of care. I have reviewedthe patient's medical history in detail and updated the computerized patient record.     Cecilio Burnett, APRN - CNP

## 2023-01-17 LAB — VITAMIN B-12: 375 PG/ML (ref 232–1245)

## 2023-01-17 ASSESSMENT — ENCOUNTER SYMPTOMS
ABDOMINAL PAIN: 0
SHORTNESS OF BREATH: 0
WHEEZING: 0
COUGH: 0

## 2023-01-21 ENCOUNTER — PATIENT MESSAGE (OUTPATIENT)
Dept: FAMILY MEDICINE CLINIC | Age: 23
End: 2023-01-21

## 2023-01-21 DIAGNOSIS — R00.2 PALPITATIONS: Primary | ICD-10-CM

## 2023-01-21 DIAGNOSIS — L81.9 DISCOLORATION OF SKIN OF LOWER LEG: ICD-10-CM

## 2023-01-23 NOTE — TELEPHONE ENCOUNTER
From: Barbara Ledezmaaltagracia  To: Adelina ALEGRIA George  Sent: 1/21/2023 12:17 PM EST  Subject: Cardio Referral    Hi Adelina! I called [facility] and scheduled an appointment with Dr Andersen (Dr Steele must not be accepting patients). I usually look new providers up prior to seeing them and I found some reviews online noting some inappropriate behavior that this physician has had with female patients. I am not sure of the legitimacy of these claims, however, I would like a referral to another physician just so I can ensure I will be comfortable with them.     Is there another physician in the [facility] network that you could refer me to or could recommend at another agency? I know Dr Maurer works through [facility] and then [facility] also has physicians in [place]. I appreciate your help and apologize if I’m complicating things. Thank you!    [email or web address]

## 2023-01-24 ENCOUNTER — TELEPHONE (OUTPATIENT)
Dept: CARDIOLOGY CLINIC | Age: 23
End: 2023-01-24

## 2023-01-24 NOTE — TELEPHONE ENCOUNTER
Reason for Cancellation: Changed Provider     Patient Comments: Looking for different cardio provider

## 2023-01-24 NOTE — TELEPHONE ENCOUNTER
Appointment canceled for Yanet Silva ([id number])   Visit Type: NEW PATIENT   Date        Time      Length    Provider                  Department   1/25/2023    8:45 AM  30 mins. MD [address]      Reason for Cancellation: Changed Provider      Patient Comments: Looking for different cardio provider      Appointment Scheduled  (Newest Message First)  Meme Davis  Patient Appointment Cancel Request Pool 14 hours ago (5:07 PM)     [facility]  Appointment canceled for Yanet Silva ([id number])  Visit Type: NEW PATIENT  Date        Time      Length    Provider                  Department  1/25/2023    8:45 AM  30 mins. MD Yu Greer [address]     Reason for Cancellation: Changed Provider     Patient Comments: Looking for different cardio provider        Kevon Covarrubias [facility] Cardiology Clinical Staff (supporting Kt Forman MD) 14 hours ago (5:07 PM)     [facility]  This message was automatically generated when an appointment dated 1/25/2023 was cancelled.      The cancelled appointment contained the following questionnaire data:     History questionnaire submitted on Friday January 20, 2023 at 11:11:43 PM  Questionnaire: Cardiothoracic History  Patient: Yanet Silva [[id number]]        Medical History:     Question: Abnormal ECG  Response: Yes  Date: 10/10/22  Comments: Sinus tachycardia     Question: Congenital heart disease  Response: No Response  Date:   Comments:      Question: High blood pressure  Response: No Response  Date:   Comments:      Question: Aneurysm  Response: No Response  Date:   Comments:      Question: COPD  Response: No Response  Date:   Comments:      Question: Kidney disease  Response: No Response  Date:   Comments:      Question: Arrhythmia  Response: No Response  Date:   Comments:      Question: Coronary artery disease  Response: No Response  Date:   Comments:      Question: Mitral valve prolapse  Response: No Response  Date:   Comments: Question: Asthma  Response: No Response  Date:   Comments:      Question: Deep vein thrombosis  Response: No Response  Date:   Comments:      Question: Heart Attack  Response: No Response  Date:   Comments:      Question: Atrial fibrillation  Response: No Response  Date:   Comments:      Question: Diabetes mellitus  Response: No Response  Date:   Comments:      Question: Pulmonary embolism  Response: No Response  Date:   Comments:      Question: Cancer  Response: No Response  Date:   Comments:      Question: Sleep Apnea  Response: No Response  Date:   Comments:      Question: Congestive heart failure  Response: No Response  Date:   Comments:      Question: Heart valve problem  Response: No Response  Date:   Comments:      Question: Stroke  Response: No Response  Date:   Comments:      Question: Clotting disorder  Response: No Response  Date:   Comments:      Question: Hyperlipidemia  Response: No Response  Date:   Comments:      Question: GI bleeding  Response: No Response  Date:   Comments:      Question: Seizures  Response: No Response  Date:   Comments:      Question: Hepatitis  Response: No Response  Date:   Comments:            Surgical History:     Question: Cardiac catheterization  Response: No  Date:   Comments:      Question: Ablation procedure  Response: No  Date:   Comments:      Question: Coronary stent placement  Response: No  Date:   Comments:      Question: Carotid endarterectomy  Response: No  Date:   Comments:      Question: Aneurysm repair  Response: No  Date:   Comments:      Question: Pacemaker insertion  Response: No  Date:   Comments:      Question: Aortic Valve Replacement  Response: No  Date:   Comments:      Question: Carotid stent placement  Response: No  Date:   Comments:      Question: Valve replacement  Response: No  Date:   Comments:      Question: Arterial bypass  Response: No  Date:   Comments:      Question: Coronary angioplasty  Response: No  Date:   Comments:      Question: Vein surgery  Response: No  Date:   Comments:      Question: CABG  Response: No  Date:   Comments:            Family History:     Problem: Heart Attack     Relation: Maternal Grandfather  Name: Richar Gupta  Comments:         Substance and Sexual Activity:     Question: Tobacco Use  Response: Never  Comments:            Substance and Sexual Activity:     Question: Alcohol Use  Response: Not Currently  Drinks/Week: 0 Glasses of wine, 0 Cans of beer, 0   Shots of liquor, 0 Drinks containing 0.5 oz of alcohol  Comments:            Substance and Sexual Activity:     Question: Drug Use  Response: Never     Question: Sexually Active  Response: Yes  Partners: Male  Comments: On birth control       Batch Job User Cat  Trace Setting 21 hours ago (10:44 AM)     [employer] User Cat  Trace Setting 21 hours ago (10:44 AM)     Nathalie Martel MD  Trace Setting 3 days ago     [address] Alyson Jerome looking forward to seeing you at your upcoming appointment on 1/25/23 at 8:45 AM.     Now you can save time and skip the clipboard! Visit Pre-Check lets you complete your appointment paperwork and pay your copay in advance of your appointment. Then, when you arrive for your appointment, simply stop at the  to let them know you're there. Please complete your Visit Pre-Check before you arrive. Click [email or web address] to view more details about your appointment and complete your Visit Pre-Check.              Julio Valencia MD 4 days ago     [facility]  History questionnaire submitted on Friday January 20, 2023 at 11:11:43 PM  Questionnaire: Cardiothoracic History  Patient: Endy Mckeon [[id number]]        Medical History:     Question: Abnormal ECG  Response: Yes  Date: 10/10/22  Comments: Sinus tachycardia     Question: Congenital heart disease  Response: No Response  Date:   Comments:      Question: High blood pressure  Response: No Response  Date:   Comments:      Question: Aneurysm  Response: No Response  Date:   Comments:      Question: COPD  Response: No Response  Date:   Comments:      Question: Kidney disease  Response: No Response  Date:   Comments:      Question: Arrhythmia  Response: No Response  Date:   Comments:      Question: Coronary artery disease  Response: No Response  Date:   Comments:      Question: Mitral valve prolapse  Response: No Response  Date:   Comments:      Question: Asthma  Response: No Response  Date:   Comments:      Question: Deep vein thrombosis  Response: No Response  Date:   Comments:      Question: Heart Attack  Response: No Response  Date:   Comments:      Question: Atrial fibrillation  Response: No Response  Date:   Comments:      Question: Diabetes mellitus  Response: No Response  Date:   Comments:      Question: Pulmonary embolism  Response: No Response  Date:   Comments:      Question: Cancer  Response: No Response  Date:   Comments:      Question: Sleep Apnea  Response: No Response  Date:   Comments:      Question: Congestive heart failure  Response: No Response  Date:   Comments:      Question: Heart valve problem  Response: No Response  Date:   Comments:      Question: Stroke  Response: No Response  Date:   Comments:      Question: Clotting disorder  Response: No Response  Date:   Comments:      Question: Hyperlipidemia  Response: No Response  Date:   Comments:      Question: GI bleeding  Response: No Response  Date:   Comments:      Question: Seizures  Response: No Response  Date:   Comments:      Question: Hepatitis  Response: No Response  Date:   Comments:            Surgical History:     Question: Cardiac catheterization  Response: No  Date:   Comments:      Question: Ablation procedure  Response: No  Date:   Comments:      Question: Coronary stent placement  Response: No  Date:   Comments:      Question: Carotid endarterectomy  Response: No  Date:   Comments:      Question: Aneurysm repair  Response: No  Date:   Comments:      Question: Pacemaker insertion  Response: No  Date:   Comments:      Question: Aortic Valve Replacement  Response: No  Date:   Comments:      Question: Carotid stent placement  Response: No  Date:   Comments:      Question: Valve replacement  Response: No  Date:   Comments:      Question: Arterial bypass  Response: No  Date:   Comments:      Question: Coronary angioplasty  Response: No  Date:   Comments:      Question: Vein surgery  Response: No  Date:   Comments:      Question: CABG  Response: No  Date:   Comments:            Family History:     Problem: Heart Attack     Relation: Maternal Grandfather  Name: Carmen Newman  Comments:         Substance and Sexual Activity:     Question: Tobacco Use  Response: Never  Comments:            Substance and Sexual Activity:     Question: Alcohol Use  Response: Not Currently  Drinks/Week: 0 Glasses of wine, 0 Cans of beer, 0   Shots of liquor, 0 Drinks containing 0.5 oz of alcohol  Comments:            Substance and Sexual Activity:     Question: Drug Use  Response: Never     Question: Sexually Active  Response: Yes  Partners: Male  Comments:  On birth control       Mychart, [address] 4 days ago     PM  Appointment Information:      Visit Type: New Patient Appointment          Date: 1/25/2023                  Dept: [facility] Cardiology                  Provider: Len Post                  Time: 8:45 AM                  Length: 30 min     Appt Status: Scheduled

## 2023-01-31 PROBLEM — S03.00XA TMJ (DISLOCATION OF TEMPOROMANDIBULAR JOINT): Status: ACTIVE | Noted: 2023-01-31

## 2023-01-31 PROBLEM — F90.9 ADHD (ATTENTION DEFICIT HYPERACTIVITY DISORDER): Status: ACTIVE | Noted: 2023-01-31

## 2023-01-31 PROBLEM — F32.9 MAJOR DEPRESSIVE DISORDER: Status: ACTIVE | Noted: 2023-01-31

## 2023-01-31 PROBLEM — F41.1 GENERALIZED ANXIETY DISORDER: Status: ACTIVE | Noted: 2023-01-31

## 2023-02-01 ENCOUNTER — OFFICE VISIT (OUTPATIENT)
Dept: CARDIOLOGY CLINIC | Age: 23
End: 2023-02-01
Payer: COMMERCIAL

## 2023-02-01 VITALS
DIASTOLIC BLOOD PRESSURE: 80 MMHG | WEIGHT: 113 LBS | HEART RATE: 100 BPM | BODY MASS INDEX: 22.19 KG/M2 | SYSTOLIC BLOOD PRESSURE: 110 MMHG | OXYGEN SATURATION: 99 % | HEIGHT: 60 IN

## 2023-02-01 DIAGNOSIS — R00.2 PALPITATIONS: Primary | ICD-10-CM

## 2023-02-01 PROCEDURE — G8427 DOCREV CUR MEDS BY ELIG CLIN: HCPCS | Performed by: INTERNAL MEDICINE

## 2023-02-01 PROCEDURE — 1036F TOBACCO NON-USER: CPT | Performed by: INTERNAL MEDICINE

## 2023-02-01 PROCEDURE — G8420 CALC BMI NORM PARAMETERS: HCPCS | Performed by: INTERNAL MEDICINE

## 2023-02-01 PROCEDURE — 99204 OFFICE O/P NEW MOD 45 MIN: CPT | Performed by: INTERNAL MEDICINE

## 2023-02-01 PROCEDURE — 93000 ELECTROCARDIOGRAM COMPLETE: CPT | Performed by: INTERNAL MEDICINE

## 2023-02-01 PROCEDURE — G8482 FLU IMMUNIZE ORDER/ADMIN: HCPCS | Performed by: INTERNAL MEDICINE

## 2023-02-01 RX ORDER — DEXTROAMPHETAMINE SACCHARATE, AMPHETAMINE ASPARTATE, DEXTROAMPHETAMINE SULFATE AND AMPHETAMINE SULFATE 1.25; 1.25; 1.25; 1.25 MG/1; MG/1; MG/1; MG/1
TABLET ORAL
COMMUNITY
Start: 2022-11-02

## 2023-02-01 RX ORDER — NORETHINDRONE ACETATE/ETHINYL ESTRADIOL AND FERROUS FUMARATE 1.5-30(21)
KIT ORAL
COMMUNITY
Start: 2023-01-08

## 2023-02-01 NOTE — PROGRESS NOTES
2023    Alex Benavides, [address] [facility]    RE: Sera Ibarra   : 2000     Dear Dr. Alex Benavides, DO :    I had the pleasure of seeing your patient, Sera Ibarra in the office today on 2023. As you are well aware, Ms. Jacob is a pleasant 20 y.o.  female with history of ADHD and anxiety disorder who was kindly referred to me for evaluation of palpitations, near-syncope, and discoloration of the lower extremities with concern for venous pooling. Currently, she reports frequent episodes of palpitations described as \"butterflies\" as well as tachycardia worsened with positional changes worse over the last several months. She has a history of ADHD and anxiety disorder and is intolerant to SSRIs. She was started on Adderall ER about 1 year ago. Her baseline heart rate did increase from 90s to low 100s since starting the Adderall. Over the last several months, she has noticed on her pulse oximeter that her resting heart rate will increase from 110s supine to 120s to 130s with standing. She has also had episodes of dizziness and near syncope especially after taking a hot shower. She had a recent episode of near syncope about 3 weeks ago about 10 minutes after taking a hot shower. She started to get tunnel vision, nausea, and shaking, and needed to lay down but did not blackout. She reports associated palpitations. Normally these episodes occur during the hot shower. She also has a tendency to notice darkening of the skin of her lower extremities as well as her toes turning \"purple\" with prolonged standing. She denies any varicose veins or venous claudication type symptoms. No history of DVT. She says these symptoms have also worsened over the last few months. She denies any Raynaud's-like symptoms her feet tend to feel cold. She denies any prior history of heart disease or arrhythmia.     Past Medical History: She  has a past medical history of ADHD (attention deficit hyperactivity disorder), Anxiety, Depression, GERD (gastroesophageal reflux disease), and TMJ (dislocation of temporomandibular joint). Surgical History: She  has a past surgical history that includes Tonsillectomy (2004). Social History: She  reports that she has never smoked. She has never used smokeless tobacco. She reports that she does not currently use alcohol. She reports that she does not use drugs. Family History: family history includes Alcohol Abuse in her brother, father, and maternal grandfather; Cancer in her maternal grandfather; Depression in her brother, brother, brother, brother, father, and mother; Early Death in her paternal grandfather; Heart Attack in her maternal grandfather; Mental Illness in her brother, brother, brother, brother, father, and mother; Osteoporosis in her mother; Substance Abuse in her brother and father; Vision Loss in her brother, brother, brother, brother, father, and mother. Current medications:   Current Outpatient Medications   Medication Sig Dispense Refill    JADEN FE 1.5/30 1.5-30 MG-MCG tablet take 1 tablet by mouth once daily      amphetamine-dextroamphetamine (ADDERALL) 5 MG tablet TAKE 1 TABLET BY MOUTH ONCE DAILY in THE afternoon, no LATER THAN 400pm      gabapentin (NEURONTIN) 300 MG capsule TAKE 1 CAPSULE BY MOUTH THREE TIMES DAILY      amphetamine-dextroamphetamine (ADDERALL XR) 25 MG extended release capsule TAKE 1 CAPSULE BY MOUTH ONCE DAILY IN THE MORNING      vitamin D (CHOLECALCIFEROL) 50 MCG (2000 UT) TABS tablet        No current facility-administered medications for this visit. Allergies: Serotonin reuptake inhibitors (ssris)     Review of Systems   General: Fatigued. Cardiovascular: See HPI. No orthopnea or PND. Respiratory: Dyspnea is present with moderate degrees of activity. Gastrointestinal: No melena or hematochezia. Genitourinary: No hematuria. Hematological: No easy bruising or bleeding. Vascular: No lower extremity edema or claudication. No Raynaud's phenomenon. Neurological: No TIA or CVA symptoms. + ADHD. Musculoskeletal: No chest wall pain. Psychiatric: + Anxiety. *All other systems were reviewed and found to be negative unless otherwise noted in the HPI*    Physical Examination: Her height is 5' (1.524 m) and weight is 113 lb (51.3 kg). Her blood pressure is 110/80 and her pulse is 100. Her oxygen saturation is 99%. She is alert and oriented to person, place, and time. No distress. Head is atraumatic. Moist mucous membranes. Neck is supple without JVD or carotid bruits. No thyroidmegaly. Lungs are clear to auscultation both anteriorly and posteriorly. No accessory muscle usage. Heart is a regular rate and rhythm. Tachycardic. Soft, systolic murmur heard best at the base. No S3 or S4. PMI is nondisplaced. Abdomen is soft and non tender. No hepatosplenomegaly. No abdominal aortic bruits or masses. Lower extremities are free of edema. No clubbing or cyanosis. Mild venous pooling noted with standing. No varicose veins. No erythema. Neurologically, cranial nerves are grossly intact. No focal sensory and/or motor deficits. Skin is intact without rash or lesions. ECG (2/1/2023): Sinus tachycardia. Heart rate 126 bpm.  No evidence of ventricular preexcitation. No significant ST-T wave changes. Normal intervals. IMPRESSION:  Symptomatic palpitations, rule out paroxysmal tachyarrhythmias. Reactive sinus tachycardia, likely exacerbated by Adderall treatment and/or underlying dehydration. Rule out vasovagal mediation versus POTS syndrome. Mild chronic venous insufficiency/pooling. Anxiety  ADHD      RECOMMENDATIONS:   Ms. Huber Lacey is recommended a 2-week ZIO event monitor to better evaluate for any pathologic arrhythmias and an echocardiogram for structural heart evaluation.   I suspect her symptoms may be suggestive of possible autonomic dysfunction likely exacerbated by sinus tachycardia due to stimulant use and vasovagal mediation. I suspect her episodes of near syncope while in the hot shower is vasovagal with compensatory tachycardic response to maintain her blood pressure. She is advised to avoid hot showers as a potential trigger. I also suspect underlying dehydration. She is encouraged to drink at least 64 ounces of water or Gatorade per day. She is advised to wear compression stockings to minimize venous pooling. If she continues to have stimulant related sinus tachycardia, then her Adderall may need to be changed to a different medication. She is agreeable to the plan. Follow up cardiac evaluation in 4 to 6 weeks. Thank you very much for allowing me to participate in Ms. Jacob's cardiac care. Should you have any questions, please do not hesitate to contact me.      Sincerely,    Dennis Willett DO, [facility] [place], [address] [facility]

## 2023-02-01 NOTE — PATIENT INSTRUCTIONS
Echocardiogram  2 week Zio event monitor  Drink at least 64 oz of water a day!    Compression stockings 20-30 mmHg  Keep legs elevated at night  Follow up in 4-6 weeks

## 2023-02-06 ENCOUNTER — PATIENT MESSAGE (OUTPATIENT)
Dept: FAMILY MEDICINE CLINIC | Age: 23
End: 2023-02-06

## 2023-02-06 RX ORDER — NORETHINDRONE ACETATE AND ETHINYL ESTRADIOL 1.5-30(21)
1 KIT ORAL DAILY
Qty: 1 PACKET | Refills: 1 | Status: SHIPPED | OUTPATIENT
Start: 2023-02-06

## 2023-02-28 ENCOUNTER — HOSPITAL ENCOUNTER (OUTPATIENT)
Dept: NON INVASIVE DIAGNOSTICS | Age: 23
Discharge: HOME | End: 2023-02-28
Payer: COMMERCIAL

## 2023-02-28 DIAGNOSIS — R00.2 PALPITATIONS: ICD-10-CM

## 2023-02-28 LAB
LV EF: 65 %
LVEF MODALITY: NORMAL

## 2023-02-28 PROCEDURE — 93306 TTE W/DOPPLER COMPLETE: CPT

## 2023-03-20 ENCOUNTER — OFFICE VISIT (OUTPATIENT)
Dept: CARDIOLOGY CLINIC | Age: 23
End: 2023-03-20
Payer: COMMERCIAL

## 2023-03-20 VITALS
SYSTOLIC BLOOD PRESSURE: 124 MMHG | HEIGHT: 60 IN | BODY MASS INDEX: 22.19 KG/M2 | OXYGEN SATURATION: 99 % | DIASTOLIC BLOOD PRESSURE: 86 MMHG | WEIGHT: 113 LBS | HEART RATE: 96 BPM

## 2023-03-20 DIAGNOSIS — R00.2 PALPITATIONS: ICD-10-CM

## 2023-03-20 DIAGNOSIS — R00.2 PALPITATIONS: Primary | ICD-10-CM

## 2023-03-20 PROCEDURE — 1036F TOBACCO NON-USER: CPT | Performed by: INTERNAL MEDICINE

## 2023-03-20 PROCEDURE — 99213 OFFICE O/P EST LOW 20 MIN: CPT | Performed by: INTERNAL MEDICINE

## 2023-03-20 PROCEDURE — G8420 CALC BMI NORM PARAMETERS: HCPCS | Performed by: INTERNAL MEDICINE

## 2023-03-20 PROCEDURE — G8482 FLU IMMUNIZE ORDER/ADMIN: HCPCS | Performed by: INTERNAL MEDICINE

## 2023-03-20 PROCEDURE — G8427 DOCREV CUR MEDS BY ELIG CLIN: HCPCS | Performed by: INTERNAL MEDICINE

## 2023-03-20 NOTE — PATIENT INSTRUCTIONS
See your psychologist and see if your stimulant medicine can be decreased or ideally changed to non stimulant.

## 2023-03-20 NOTE — PROGRESS NOTES
3/20/2023    Yanique Connors, [address] [address]    CC: Carlos Gooden NP      RE: Luis Campbell   : 2000     Dear Dr. Yanique Connors, DO :    I had the pleasure of seeing your patient, Luis Campbell in the office today on 3/20/2023. As you are well aware, Ms. Jacob is a pleasant 20 y.o.  female with history of ADHD and anxiety disorder who was kindly referred to me for evaluation of palpitations, near-syncope, and discoloration of the lower extremities with concern for venous pooling. Currently, she reports frequent episodes of palpitations described as \"butterflies\" as well as tachycardia worsened with positional changes worse over the last several months. She has a history of ADHD and anxiety disorder and is intolerant to SSRIs. She was started on Adderall ER about 1 year ago. Her baseline heart rate did increase from 90s to low 100s since starting the Adderall. Over the last several months, she has noticed on her pulse oximeter that her resting heart rate will increase from 110s supine to 120s to 130s with standing. She has also had episodes of dizziness and near syncope especially after taking a hot shower. She had a recent episode of near syncope about 3 weeks ago about 10 minutes after taking a hot shower. She started to get tunnel vision, nausea, and shaking, and needed to lay down but did not blackout. She reports associated palpitations. Normally these episodes occur during the hot shower. She also has a tendency to notice darkening of the skin of her lower extremities as well as her toes turning \"purple\" with prolonged standing. She denies any varicose veins or venous claudication type symptoms. No history of DVT. She says these symptoms have also worsened over the last few months. She denies any Raynaud's-like symptoms her feet tend to feel cold. She denies any prior history of heart disease or arrhythmia.     3/20/23: Patient here for

## 2023-03-28 RX ORDER — NORETHINDRONE ACETATE/ETHINYL ESTRADIOL AND FERROUS FUMARATE 1.5-30(21)
KIT ORAL
Qty: 1 PACKET | Refills: 1 | Status: SHIPPED | OUTPATIENT
Start: 2023-03-28

## 2023-03-31 RX ORDER — NORETHINDRONE ACETATE AND ETHINYL ESTRADIOL 1.5-30(21)
1 KIT ORAL DAILY
Qty: 1 PACKET | Refills: 1 | OUTPATIENT
Start: 2023-03-31

## 2023-04-15 SDOH — ECONOMIC STABILITY: HOUSING INSECURITY
IN THE LAST 12 MONTHS, WAS THERE A TIME WHEN YOU DID NOT HAVE A STEADY PLACE TO SLEEP OR SLEPT IN A SHELTER (INCLUDING NOW)?: NO

## 2023-04-15 SDOH — ECONOMIC STABILITY: FOOD INSECURITY: WITHIN THE PAST 12 MONTHS, THE FOOD YOU BOUGHT JUST DIDN'T LAST AND YOU DIDN'T HAVE MONEY TO GET MORE.: NEVER TRUE

## 2023-04-15 SDOH — ECONOMIC STABILITY: INCOME INSECURITY: HOW HARD IS IT FOR YOU TO PAY FOR THE VERY BASICS LIKE FOOD, HOUSING, MEDICAL CARE, AND HEATING?: NOT HARD AT ALL

## 2023-04-15 SDOH — ECONOMIC STABILITY: FOOD INSECURITY: WITHIN THE PAST 12 MONTHS, YOU WORRIED THAT YOUR FOOD WOULD RUN OUT BEFORE YOU GOT MONEY TO BUY MORE.: NEVER TRUE

## 2023-04-17 ENCOUNTER — OFFICE VISIT (OUTPATIENT)
Dept: FAMILY MEDICINE CLINIC | Age: 23
End: 2023-04-17
Payer: COMMERCIAL

## 2023-04-17 VITALS
TEMPERATURE: 97.5 F | HEART RATE: 101 BPM | DIASTOLIC BLOOD PRESSURE: 80 MMHG | SYSTOLIC BLOOD PRESSURE: 122 MMHG | HEIGHT: 60 IN | OXYGEN SATURATION: 100 % | BODY MASS INDEX: 21.75 KG/M2 | WEIGHT: 110.8 LBS

## 2023-04-17 DIAGNOSIS — N92.0 MENORRHAGIA WITH REGULAR CYCLE: Primary | ICD-10-CM

## 2023-04-17 DIAGNOSIS — R53.82 CHRONIC FATIGUE: ICD-10-CM

## 2023-04-17 DIAGNOSIS — R00.2 PALPITATIONS: ICD-10-CM

## 2023-04-17 PROCEDURE — G8427 DOCREV CUR MEDS BY ELIG CLIN: HCPCS | Performed by: STUDENT IN AN ORGANIZED HEALTH CARE EDUCATION/TRAINING PROGRAM

## 2023-04-17 PROCEDURE — 1036F TOBACCO NON-USER: CPT | Performed by: STUDENT IN AN ORGANIZED HEALTH CARE EDUCATION/TRAINING PROGRAM

## 2023-04-17 PROCEDURE — G8420 CALC BMI NORM PARAMETERS: HCPCS | Performed by: STUDENT IN AN ORGANIZED HEALTH CARE EDUCATION/TRAINING PROGRAM

## 2023-04-17 PROCEDURE — 99214 OFFICE O/P EST MOD 30 MIN: CPT | Performed by: STUDENT IN AN ORGANIZED HEALTH CARE EDUCATION/TRAINING PROGRAM

## 2023-04-17 RX ORDER — ATOMOXETINE 40 MG/1
40 CAPSULE ORAL EVERY MORNING
COMMUNITY
Start: 2023-03-21

## 2023-04-17 RX ORDER — NORETHINDRONE ACETATE AND ETHINYL ESTRADIOL 1.5-30(21)
1 KIT ORAL DAILY
Qty: 3 PACKET | Refills: 3 | Status: SHIPPED | OUTPATIENT
Start: 2023-04-17

## 2023-04-17 ASSESSMENT — ENCOUNTER SYMPTOMS
WHEEZING: 0
SORE THROAT: 0
NAUSEA: 0
COUGH: 0
SHORTNESS OF BREATH: 0
RHINORRHEA: 0
VOMITING: 0
DIARRHEA: 0
ABDOMINAL PAIN: 0

## 2023-04-17 NOTE — PROGRESS NOTES
Subjective  José Miguel Ledezma, 20 y.o. female presents today with:  Chief Complaint   Patient presents with    3 Month Follow-Up    ADHD     No longer taking Adderall. Is now taking Straterra. Is working well for focus. No issues with sleep or appetite. Fatigue     Remains the same. Maybe even worse since stopping Adderall. Menorrhagia     States periods are now regular. Does have a little heavy bleeding at start of cycle. Dizziness     Has dizziness a couple of times a week. States usually happens when she stands up. It will only last for a very short time. Palpitations     No longer having. Patient with 3-month follow-up appointment. Patient with menorrhagia with regular cycle. She is on OCPs. Tolerating well. She states her cycles are now regular. She does have heavy bleeding at the start of her cycle. She does report that her cycles last slightly less than when she was not on OCPs. She is due for Pap. This will be scheduled. Patient also with ADHD. She is no longer taking Adderall and was switched to Strattera. This has helped ease her palpitations. She was evaluated by cardiology. They otherwise recommended stopping Adderall. She is doing okay with the Strattera. She does have some increased fatigue, however, feels that this is likely from stopping the Adderall. She does continue to have occasional dizziness a couple times per week usually when she goes to a standing position. She is trying to drink more water. She has no other concerns at this time. Review of Systems   Constitutional:  Negative for chills, fatigue, fever and unexpected weight change. HENT:  Negative for congestion, rhinorrhea and sore throat. Respiratory:  Negative for cough, shortness of breath and wheezing. Cardiovascular:  Negative for chest pain, palpitations and leg swelling. Gastrointestinal:  Negative for abdominal pain, diarrhea, nausea and vomiting.    Genitourinary:  Negative

## 2023-09-25 DIAGNOSIS — N92.0 MENORRHAGIA WITH REGULAR CYCLE: ICD-10-CM

## 2023-09-25 RX ORDER — NORETHINDRONE ACETATE AND ETHINYL ESTRADIOL 1.5-30(21)
1 KIT ORAL DAILY
Qty: 3 PACKET | Refills: 3 | Status: SHIPPED | OUTPATIENT
Start: 2023-09-25

## 2024-01-16 ENCOUNTER — OFFICE VISIT (OUTPATIENT)
Dept: FAMILY MEDICINE CLINIC | Age: 24
End: 2024-01-16
Payer: COMMERCIAL

## 2024-01-16 VITALS
HEART RATE: 93 BPM | HEIGHT: 60 IN | WEIGHT: 116 LBS | OXYGEN SATURATION: 99 % | TEMPERATURE: 98.4 F | DIASTOLIC BLOOD PRESSURE: 72 MMHG | SYSTOLIC BLOOD PRESSURE: 112 MMHG | BODY MASS INDEX: 22.78 KG/M2

## 2024-01-16 DIAGNOSIS — N94.10 DYSPAREUNIA IN FEMALE: ICD-10-CM

## 2024-01-16 DIAGNOSIS — R10.2 PELVIC PAIN: ICD-10-CM

## 2024-01-16 DIAGNOSIS — Z01.419 WELL WOMAN EXAM WITH ROUTINE GYNECOLOGICAL EXAM: ICD-10-CM

## 2024-01-16 DIAGNOSIS — Z01.419 WELL WOMAN EXAM WITH ROUTINE GYNECOLOGICAL EXAM: Primary | ICD-10-CM

## 2024-01-16 PROCEDURE — 99395 PREV VISIT EST AGE 18-39: CPT | Performed by: STUDENT IN AN ORGANIZED HEALTH CARE EDUCATION/TRAINING PROGRAM

## 2024-01-16 PROCEDURE — G8484 FLU IMMUNIZE NO ADMIN: HCPCS | Performed by: STUDENT IN AN ORGANIZED HEALTH CARE EDUCATION/TRAINING PROGRAM

## 2024-01-16 RX ORDER — METHYLPHENIDATE HYDROCHLORIDE 5 MG/1
2.5 TABLET ORAL DAILY
COMMUNITY
Start: 2024-01-09

## 2024-01-16 ASSESSMENT — PATIENT HEALTH QUESTIONNAIRE - PHQ9
6. FEELING BAD ABOUT YOURSELF - OR THAT YOU ARE A FAILURE OR HAVE LET YOURSELF OR YOUR FAMILY DOWN: 2
7. TROUBLE CONCENTRATING ON THINGS, SUCH AS READING THE NEWSPAPER OR WATCHING TELEVISION: 3
8. MOVING OR SPEAKING SO SLOWLY THAT OTHER PEOPLE COULD HAVE NOTICED. OR THE OPPOSITE, BEING SO FIGETY OR RESTLESS THAT YOU HAVE BEEN MOVING AROUND A LOT MORE THAN USUAL: 0
5. POOR APPETITE OR OVEREATING: 0
4. FEELING TIRED OR HAVING LITTLE ENERGY: 3
1. LITTLE INTEREST OR PLEASURE IN DOING THINGS: 1
9. THOUGHTS THAT YOU WOULD BE BETTER OFF DEAD, OR OF HURTING YOURSELF: 0
SUM OF ALL RESPONSES TO PHQ QUESTIONS 1-9: 13
SUM OF ALL RESPONSES TO PHQ QUESTIONS 1-9: 13
10. IF YOU CHECKED OFF ANY PROBLEMS, HOW DIFFICULT HAVE THESE PROBLEMS MADE IT FOR YOU TO DO YOUR WORK, TAKE CARE OF THINGS AT HOME, OR GET ALONG WITH OTHER PEOPLE: 1
SUM OF ALL RESPONSES TO PHQ9 QUESTIONS 1 & 2: 2
2. FEELING DOWN, DEPRESSED OR HOPELESS: 1
SUM OF ALL RESPONSES TO PHQ QUESTIONS 1-9: 13
3. TROUBLE FALLING OR STAYING ASLEEP: 3
SUM OF ALL RESPONSES TO PHQ QUESTIONS 1-9: 13

## 2024-01-16 ASSESSMENT — ENCOUNTER SYMPTOMS
VOMITING: 0
COUGH: 0
WHEEZING: 0
ABDOMINAL DISTENTION: 0
CONSTIPATION: 1
NAUSEA: 0
ABDOMINAL PAIN: 0
DIARRHEA: 0
BACK PAIN: 0
SHORTNESS OF BREATH: 0

## 2024-01-16 NOTE — PROGRESS NOTES
Subjective:      Chief Complaint   Patient presents with    Gynecologic Exam     LMP 12/26/23. States she always have a clear to thick & white in color, vaginal discharge. Denies any vaginal odor, pain or itching. Does have pain during intercourse, even with lubrication. Denies concerns for STDs, but has never been tested. Would like to have testing done today. Denies urinary frequency, urgency, pain or incontinence.        Barbara Jacob is a 23 y.o.female No obstetric history on file. here for routine exam.  Current Complaints: normal menses, no abnormal bleeding, pelvic pain or discharge, no breast pain or new or enlarging lumps on self exam.    Menstrual history:   regular menses 28 days  Sexual activity:  yes, desires std testing  Abnormal vaginaldischarge:  Yes  Contraceptive method:  OCP (estrogen/progesterone)    Patient with concerns for dyspareunia.  She also has frequent urination and incomplete bladder emptying.  Has never had a pelvic ultrasound before.  She would be interested in pelvic floor therapy if indicated.    Past Medical History:   Diagnosis Date    ADHD (attention deficit hyperactivity disorder) 2012    Diagnosed 2021    Anxiety 2012    Depression 2012    GERD (gastroesophageal reflux disease)     TMJ (dislocation of temporomandibular joint)      Past Surgical History:   Procedure Laterality Date    TONSILLECTOMY  2004     Family History   Problem Relation Age of Onset    Depression Mother     Mental Illness Mother     Osteoporosis Mother     Vision Loss Mother     Alcohol Abuse Father     Depression Father     Mental Illness Father     Substance Abuse Father     Vision Loss Father     Alcohol Abuse Brother     Depression Brother     Mental Illness Brother     Substance Abuse Brother     Vision Loss Brother     Depression Brother     Mental Illness Brother     Vision Loss Brother     Depression Brother     Mental Illness Brother     Vision Loss Brother     Depression Brother     Mental

## 2024-01-17 LAB
CLUE CELLS VAG QL WET PREP: NORMAL
T VAGINALIS VAG QL WET PREP: NORMAL
TRICHOMONAS VAGINALIS SCREEN: NEGATIVE
YEAST VAG QL WET PREP: NORMAL

## 2024-01-18 ENCOUNTER — NURSE ONLY (OUTPATIENT)
Dept: FAMILY MEDICINE CLINIC | Age: 24
End: 2024-01-18
Payer: COMMERCIAL

## 2024-01-18 DIAGNOSIS — R30.0 DYSURIA: Primary | ICD-10-CM

## 2024-01-18 DIAGNOSIS — R30.0 DYSURIA: ICD-10-CM

## 2024-01-18 LAB
BILIRUBIN, POC: NORMAL
BLOOD URINE, POC: NORMAL
CLARITY, POC: CLEAR
COLOR, POC: YELLOW
GLUCOSE URINE, POC: NORMAL
KETONES, POC: NORMAL
LEUKOCYTE EST, POC: NORMAL
NITRITE, POC: NORMAL
PH, POC: 7.5
PROTEIN, POC: NORMAL
SPECIFIC GRAVITY, POC: 1
UROBILINOGEN, POC: NORMAL

## 2024-01-18 PROCEDURE — 81003 URINALYSIS AUTO W/O SCOPE: CPT | Performed by: STUDENT IN AN ORGANIZED HEALTH CARE EDUCATION/TRAINING PROGRAM

## 2024-01-19 LAB
C TRACH DNA CVX QL NAA+PROBE: NEGATIVE
N GONORRHOEA DNA CERV MUCUS QL NAA+PROBE: NEGATIVE

## 2024-01-20 LAB — BACTERIA UR CULT: NORMAL

## 2024-01-25 ENCOUNTER — HOSPITAL ENCOUNTER (OUTPATIENT)
Dept: ULTRASOUND IMAGING | Age: 24
Discharge: HOME | End: 2024-01-27
Payer: COMMERCIAL

## 2024-01-25 DIAGNOSIS — R10.2 PELVIC PAIN: ICD-10-CM

## 2024-01-25 DIAGNOSIS — N94.10 DYSPAREUNIA IN FEMALE: ICD-10-CM

## 2024-01-25 PROCEDURE — 76830 TRANSVAGINAL US NON-OB: CPT

## 2024-01-25 PROCEDURE — 93975 VASCULAR STUDY: CPT

## 2024-01-25 PROCEDURE — 76856 US EXAM PELVIC COMPLETE: CPT

## 2024-01-26 NOTE — RESULT ENCOUNTER NOTE
Please inform patient that gonorrhea, chlamydia and trichomonas testing was negative.  Pap smear was normal.  Repeat in 3 years.  Thanks

## 2024-02-13 ENCOUNTER — OFFICE VISIT (OUTPATIENT)
Dept: FAMILY MEDICINE CLINIC | Age: 24
End: 2024-02-13
Payer: COMMERCIAL

## 2024-02-13 VITALS
OXYGEN SATURATION: 99 % | WEIGHT: 120 LBS | BODY MASS INDEX: 23.56 KG/M2 | HEART RATE: 101 BPM | SYSTOLIC BLOOD PRESSURE: 110 MMHG | DIASTOLIC BLOOD PRESSURE: 74 MMHG | TEMPERATURE: 98.6 F | HEIGHT: 60 IN

## 2024-02-13 DIAGNOSIS — R53.82 CHRONIC FATIGUE: ICD-10-CM

## 2024-02-13 DIAGNOSIS — N94.10 DYSPAREUNIA IN FEMALE: Primary | ICD-10-CM

## 2024-02-13 DIAGNOSIS — E55.9 VITAMIN D DEFICIENCY: ICD-10-CM

## 2024-02-13 PROCEDURE — G8484 FLU IMMUNIZE NO ADMIN: HCPCS | Performed by: STUDENT IN AN ORGANIZED HEALTH CARE EDUCATION/TRAINING PROGRAM

## 2024-02-13 PROCEDURE — G8420 CALC BMI NORM PARAMETERS: HCPCS | Performed by: STUDENT IN AN ORGANIZED HEALTH CARE EDUCATION/TRAINING PROGRAM

## 2024-02-13 PROCEDURE — G8427 DOCREV CUR MEDS BY ELIG CLIN: HCPCS | Performed by: STUDENT IN AN ORGANIZED HEALTH CARE EDUCATION/TRAINING PROGRAM

## 2024-02-13 PROCEDURE — 99214 OFFICE O/P EST MOD 30 MIN: CPT | Performed by: STUDENT IN AN ORGANIZED HEALTH CARE EDUCATION/TRAINING PROGRAM

## 2024-02-13 PROCEDURE — 1036F TOBACCO NON-USER: CPT | Performed by: STUDENT IN AN ORGANIZED HEALTH CARE EDUCATION/TRAINING PROGRAM

## 2024-02-13 RX ORDER — OMEPRAZOLE 20 MG/1
20 CAPSULE, DELAYED RELEASE ORAL DAILY
COMMUNITY

## 2024-02-13 NOTE — PROGRESS NOTES
Subjective  Barbara Jacob, 23 y.o. female presents today with:  Chief Complaint   Patient presents with    Follow-up    Results     Would like to review test results.        Patient with follow-up appointment.    Patient following up on recent imaging and lab work.  She continues to have dyspareunia.  She states that every time she is very sore.  She would be interested in pursuing pelvic floor therapy.  She would like a referral for this.    Patient also with chronic fatigue, all labs were recently normal.  Will plan to check vitamin D as she does have history of vitamin D deficiency.  She just overall is not feeling great.  She states that she gets home from working and will sleep 3 hours and then go to bed.  She states she just always feels tired.  She denies any snoring or waking up at night gasping for air.  She is always feels tired.  She has had previously normal lab work.  She states that a couple members of her family also have this.  She denies any chest pain, shortness of breath or palpitations associated with it.  She does feel like her mood could be somewhat better controlled, however, she has not able to treat her mood disorder with SSRIs as she has suicidal ideation and other side effects with these.  She is following with psychiatry.  She is also establishing with a new counselor.  She has no other concerns at this time.      Review of Systems   Constitutional:  Positive for fatigue. Negative for chills, fever and unexpected weight change.   HENT:  Negative for congestion, rhinorrhea and sore throat.    Eyes:  Negative for discharge.   Respiratory:  Negative for cough, shortness of breath and wheezing.    Cardiovascular:  Negative for chest pain, palpitations and leg swelling.   Gastrointestinal:  Negative for abdominal pain, diarrhea, nausea and vomiting.   Genitourinary:  Positive for dyspareunia. Negative for difficulty urinating.   Musculoskeletal:  Negative for arthralgias and joint swelling.

## 2024-02-20 ENCOUNTER — TELEPHONE (OUTPATIENT)
Dept: FAMILY MEDICINE CLINIC | Age: 24
End: 2024-02-20

## 2024-02-20 DIAGNOSIS — N94.10 DYSPAREUNIA IN FEMALE: Primary | ICD-10-CM

## 2024-02-20 NOTE — TELEPHONE ENCOUNTER
Per Occupation Therapy \"we no longer have a therapist to treat this diagnosis \" Please place a new external referral.

## 2024-02-28 DIAGNOSIS — R53.82 CHRONIC FATIGUE: ICD-10-CM

## 2024-02-28 DIAGNOSIS — E55.9 VITAMIN D DEFICIENCY: ICD-10-CM

## 2024-02-28 LAB
ALBUMIN SERPL-MCNC: 4.5 G/DL (ref 3.5–4.6)
ALP SERPL-CCNC: 39 U/L (ref 40–130)
ALT SERPL-CCNC: 21 U/L (ref 0–33)
ANION GAP SERPL CALCULATED.3IONS-SCNC: 15 MEQ/L (ref 9–15)
AST SERPL-CCNC: 19 U/L (ref 0–35)
BASOPHILS # BLD: 0 K/UL (ref 0–0.2)
BASOPHILS NFR BLD: 0.7 %
BILIRUB SERPL-MCNC: 0.3 MG/DL (ref 0.2–0.7)
BUN SERPL-MCNC: 5 MG/DL (ref 6–20)
CALCIUM SERPL-MCNC: 9.4 MG/DL (ref 8.5–9.9)
CHLORIDE SERPL-SCNC: 103 MEQ/L (ref 95–107)
CO2 SERPL-SCNC: 22 MEQ/L (ref 20–31)
CREAT SERPL-MCNC: 0.49 MG/DL (ref 0.5–0.9)
EOSINOPHIL # BLD: 0.1 K/UL (ref 0–0.7)
EOSINOPHIL NFR BLD: 1.5 %
ERYTHROCYTE [DISTWIDTH] IN BLOOD BY AUTOMATED COUNT: 12.4 % (ref 11.5–14.5)
GLOBULIN SER CALC-MCNC: 2.8 G/DL (ref 2.3–3.5)
GLUCOSE SERPL-MCNC: 105 MG/DL (ref 70–99)
HCT VFR BLD AUTO: 38.7 % (ref 37–47)
HGB BLD-MCNC: 13.1 G/DL (ref 12–16)
LYMPHOCYTES # BLD: 1.8 K/UL (ref 1–4.8)
LYMPHOCYTES NFR BLD: 39.5 %
MCH RBC QN AUTO: 29.4 PG (ref 27–31.3)
MCHC RBC AUTO-ENTMCNC: 33.9 % (ref 33–37)
MCV RBC AUTO: 87 FL (ref 79.4–94.8)
MONOCYTES # BLD: 0.4 K/UL (ref 0.2–0.8)
MONOCYTES NFR BLD: 7.6 %
NEUTROPHILS # BLD: 2.3 K/UL (ref 1.4–6.5)
NEUTS SEG NFR BLD: 50.3 %
PLATELET # BLD AUTO: 326 K/UL (ref 130–400)
POTASSIUM SERPL-SCNC: 3.8 MEQ/L (ref 3.4–4.9)
PROT SERPL-MCNC: 7.3 G/DL (ref 6.3–8)
RBC # BLD AUTO: 4.45 M/UL (ref 4.2–5.4)
SODIUM SERPL-SCNC: 140 MEQ/L (ref 135–144)
TSH REFLEX: 1.63 UIU/ML (ref 0.44–3.86)
WBC # BLD AUTO: 4.6 K/UL (ref 4.8–10.8)

## 2024-02-29 LAB
FOLATE: 13.3 NG/ML
HEPATITIS C ANTIBODY: NONREACTIVE
HIV AG/AB: NONREACTIVE
VITAMIN B-12: 428 PG/ML (ref 232–1245)
VITAMIN D 25-HYDROXY: 61.7 NG/ML (ref 30–100)

## 2024-04-18 ENCOUNTER — PATIENT MESSAGE (OUTPATIENT)
Dept: FAMILY MEDICINE CLINIC | Age: 24
End: 2024-04-18

## 2024-04-18 NOTE — TELEPHONE ENCOUNTER
From: Barbara Jacob  To: Dr. Ronda Hartman  Sent: 4/18/2024 12:17 PM EDT  Subject: Tetanus vaccine    Hello! This is probably a silly question but I still wanted to ask. I scraped my finger on an old piece of she metal while I was outside. The skin didn’t break or bleed but it did cause some redness and superficial scratch similar to a paper cut. I didn’t notice any pain when I hand sanitized. I would assume since it didn’t break skin, I would probably be fine to wait until my next appt to get my tetanus booster. But I just wanted to check and see if I should be concerned/get the booster earlier. Thanks!!

## 2024-04-19 ENCOUNTER — NURSE ONLY (OUTPATIENT)
Dept: FAMILY MEDICINE CLINIC | Age: 24
End: 2024-04-19
Payer: COMMERCIAL

## 2024-04-19 DIAGNOSIS — Z23 NEED FOR DIPHTHERIA-TETANUS-PERTUSSIS (TDAP) VACCINE: Primary | ICD-10-CM

## 2024-04-19 PROCEDURE — 90471 IMMUNIZATION ADMIN: CPT | Performed by: STUDENT IN AN ORGANIZED HEALTH CARE EDUCATION/TRAINING PROGRAM

## 2024-04-19 PROCEDURE — 90715 TDAP VACCINE 7 YRS/> IM: CPT | Performed by: STUDENT IN AN ORGANIZED HEALTH CARE EDUCATION/TRAINING PROGRAM

## 2024-04-19 SDOH — ECONOMIC STABILITY: FOOD INSECURITY: WITHIN THE PAST 12 MONTHS, THE FOOD YOU BOUGHT JUST DIDN'T LAST AND YOU DIDN'T HAVE MONEY TO GET MORE.: NEVER TRUE

## 2024-04-19 SDOH — ECONOMIC STABILITY: INCOME INSECURITY: HOW HARD IS IT FOR YOU TO PAY FOR THE VERY BASICS LIKE FOOD, HOUSING, MEDICAL CARE, AND HEATING?: NOT HARD AT ALL

## 2024-04-19 SDOH — ECONOMIC STABILITY: FOOD INSECURITY: WITHIN THE PAST 12 MONTHS, YOU WORRIED THAT YOUR FOOD WOULD RUN OUT BEFORE YOU GOT MONEY TO BUY MORE.: NEVER TRUE

## 2024-09-01 DIAGNOSIS — N92.0 MENORRHAGIA WITH REGULAR CYCLE: ICD-10-CM

## 2024-09-03 RX ORDER — NORETHINDRONE ACETATE AND ETHINYL ESTRADIOL 1.5-30(21)
1 KIT ORAL DAILY
Qty: 3 PACKET | Refills: 3 | Status: SHIPPED | OUTPATIENT
Start: 2024-09-03

## 2024-11-06 ENCOUNTER — OFFICE VISIT (OUTPATIENT)
Dept: FAMILY MEDICINE CLINIC | Age: 24
End: 2024-11-06
Payer: COMMERCIAL

## 2024-11-06 VITALS
OXYGEN SATURATION: 100 % | RESPIRATION RATE: 16 BRPM | DIASTOLIC BLOOD PRESSURE: 70 MMHG | HEART RATE: 80 BPM | WEIGHT: 124 LBS | BODY MASS INDEX: 24.35 KG/M2 | HEIGHT: 60 IN | TEMPERATURE: 98 F | SYSTOLIC BLOOD PRESSURE: 108 MMHG

## 2024-11-06 DIAGNOSIS — G47.19 EXCESSIVE DAYTIME SLEEPINESS: ICD-10-CM

## 2024-11-06 DIAGNOSIS — F41.1 GENERALIZED ANXIETY DISORDER: ICD-10-CM

## 2024-11-06 DIAGNOSIS — R53.82 CHRONIC FATIGUE: Primary | ICD-10-CM

## 2024-11-06 DIAGNOSIS — G47.8 NON-RESTORATIVE SLEEP: ICD-10-CM

## 2024-11-06 DIAGNOSIS — F90.9 ATTENTION DEFICIT HYPERACTIVITY DISORDER (ADHD), UNSPECIFIED ADHD TYPE: ICD-10-CM

## 2024-11-06 PROCEDURE — 99214 OFFICE O/P EST MOD 30 MIN: CPT | Performed by: NURSE PRACTITIONER

## 2024-11-06 PROCEDURE — G8427 DOCREV CUR MEDS BY ELIG CLIN: HCPCS | Performed by: NURSE PRACTITIONER

## 2024-11-06 PROCEDURE — G8484 FLU IMMUNIZE NO ADMIN: HCPCS | Performed by: NURSE PRACTITIONER

## 2024-11-06 PROCEDURE — 1036F TOBACCO NON-USER: CPT | Performed by: NURSE PRACTITIONER

## 2024-11-06 PROCEDURE — G8420 CALC BMI NORM PARAMETERS: HCPCS | Performed by: NURSE PRACTITIONER

## 2024-11-06 NOTE — PROGRESS NOTES
Barbara Jacob (:  2000) is a 24 y.o. female, Established patient, here for evaluation of the following chief complaint(s):  Fatigue (Patient is here with c/o fatigue. She states she can sleep all night and will still not be able to keep awake. Patient states she is in school full time now an hour away. Patient states she had to pull over at a [employer] to sleep.) and Sleep Talking (Patient is here with c/o sleep talking. She states she can have a full conversation in her sleep. )          Subjective   History of Present Illness  The patient presents for evaluation of chronic fatigue.    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 A few weeks ago, she had to stop and sleep in a parking lot due to extreme tiredness while driving. Despite sleeping for 8 to 9 hours a night, she often wakes up and talks in her sleep, indicating disturbed sleep. Her fatigue seems to worsen with increased activity and stress. She does not experience gasping for air upon waking but occasionally wakes up with a dry mouth, possibly due to mouth breathing during sleep. She reports no history of snoring or restless leg syndrome.    She is currently on Ritalin and takes a booster dose in the afternoon. She also consumes coffee. She was previously on Adderall but discontinued it two years ago due to presyncope and near fainting episodes, as well as tachycardia with a resting heart rate of 110. She has tried Provigil but it did not alleviate her symptoms.    She is also on gabapentin for anxiety as she does not respond well to SSRIs. She has ADHD and believes her fatigue is more likely related to this condition than narcolepsy. She feels her depression is better managed now than it was 3 or 4 years ago and does not believe her excessive sleepiness is related to uncontrolled depression.    She is a , a job she finds stressful. Even

## 2024-11-26 DIAGNOSIS — N92.0 MENORRHAGIA WITH REGULAR CYCLE: ICD-10-CM

## 2024-11-27 RX ORDER — NORETHINDRONE ACETATE AND ETHINYL ESTRADIOL 1.5-30(21)
1 KIT ORAL DAILY
Qty: 3 PACKET | Refills: 3 | OUTPATIENT
Start: 2024-11-27

## 2025-01-06 DIAGNOSIS — N92.0 MENORRHAGIA WITH REGULAR CYCLE: ICD-10-CM

## 2025-01-07 RX ORDER — NORETHINDRONE ACETATE AND ETHINYL ESTRADIOL 1.5-30(21)
1 KIT ORAL DAILY
Qty: 3 PACKET | Refills: 3 | Status: SHIPPED | OUTPATIENT
Start: 2025-01-07

## 2025-04-02 ENCOUNTER — OFFICE VISIT (OUTPATIENT)
Age: 25
End: 2025-04-02
Payer: COMMERCIAL

## 2025-04-02 VITALS
OXYGEN SATURATION: 99 % | WEIGHT: 129 LBS | TEMPERATURE: 99.1 F | HEIGHT: 60 IN | HEART RATE: 95 BPM | DIASTOLIC BLOOD PRESSURE: 66 MMHG | BODY MASS INDEX: 25.32 KG/M2 | SYSTOLIC BLOOD PRESSURE: 110 MMHG

## 2025-04-02 DIAGNOSIS — F90.9 ATTENTION DEFICIT HYPERACTIVITY DISORDER (ADHD), UNSPECIFIED ADHD TYPE: ICD-10-CM

## 2025-04-02 DIAGNOSIS — L30.9 FACIAL DERMATITIS: Primary | ICD-10-CM

## 2025-04-02 DIAGNOSIS — F32.0 CURRENT MILD EPISODE OF MAJOR DEPRESSIVE DISORDER, UNSPECIFIED WHETHER RECURRENT: ICD-10-CM

## 2025-04-02 DIAGNOSIS — G47.19 EXCESSIVE DAYTIME SLEEPINESS: ICD-10-CM

## 2025-04-02 PROCEDURE — 1036F TOBACCO NON-USER: CPT | Performed by: STUDENT IN AN ORGANIZED HEALTH CARE EDUCATION/TRAINING PROGRAM

## 2025-04-02 PROCEDURE — 99213 OFFICE O/P EST LOW 20 MIN: CPT | Performed by: STUDENT IN AN ORGANIZED HEALTH CARE EDUCATION/TRAINING PROGRAM

## 2025-04-02 PROCEDURE — G8419 CALC BMI OUT NRM PARAM NOF/U: HCPCS | Performed by: STUDENT IN AN ORGANIZED HEALTH CARE EDUCATION/TRAINING PROGRAM

## 2025-04-02 PROCEDURE — G8427 DOCREV CUR MEDS BY ELIG CLIN: HCPCS | Performed by: STUDENT IN AN ORGANIZED HEALTH CARE EDUCATION/TRAINING PROGRAM

## 2025-04-02 RX ORDER — PIMECROLIMUS 10 MG/G
CREAM TOPICAL
Qty: 60 G | Refills: 0 | Status: SHIPPED | OUTPATIENT
Start: 2025-04-02

## 2025-04-02 RX ORDER — LORATADINE 10 MG/1
10 TABLET ORAL DAILY
COMMUNITY

## 2025-04-02 SDOH — ECONOMIC STABILITY: FOOD INSECURITY: WITHIN THE PAST 12 MONTHS, THE FOOD YOU BOUGHT JUST DIDN'T LAST AND YOU DIDN'T HAVE MONEY TO GET MORE.: NEVER TRUE

## 2025-04-02 SDOH — ECONOMIC STABILITY: FOOD INSECURITY: WITHIN THE PAST 12 MONTHS, YOU WORRIED THAT YOUR FOOD WOULD RUN OUT BEFORE YOU GOT MONEY TO BUY MORE.: NEVER TRUE

## 2025-04-02 ASSESSMENT — PATIENT HEALTH QUESTIONNAIRE - PHQ9
7. TROUBLE CONCENTRATING ON THINGS, SUCH AS READING THE NEWSPAPER OR WATCHING TELEVISION: NEARLY EVERY DAY
9. THOUGHTS THAT YOU WOULD BE BETTER OFF DEAD, OR OF HURTING YOURSELF: NOT AT ALL
2. FEELING DOWN, DEPRESSED OR HOPELESS: SEVERAL DAYS
5. POOR APPETITE OR OVEREATING: NOT AT ALL
4. FEELING TIRED OR HAVING LITTLE ENERGY: NEARLY EVERY DAY
10. IF YOU CHECKED OFF ANY PROBLEMS, HOW DIFFICULT HAVE THESE PROBLEMS MADE IT FOR YOU TO DO YOUR WORK, TAKE CARE OF THINGS AT HOME, OR GET ALONG WITH OTHER PEOPLE: SOMEWHAT DIFFICULT
SUM OF ALL RESPONSES TO PHQ QUESTIONS 1-9: 11
SUM OF ALL RESPONSES TO PHQ QUESTIONS 1-9: 11
3. TROUBLE FALLING OR STAYING ASLEEP: MORE THAN HALF THE DAYS
6. FEELING BAD ABOUT YOURSELF - OR THAT YOU ARE A FAILURE OR HAVE LET YOURSELF OR YOUR FAMILY DOWN: SEVERAL DAYS
SUM OF ALL RESPONSES TO PHQ QUESTIONS 1-9: 11
8. MOVING OR SPEAKING SO SLOWLY THAT OTHER PEOPLE COULD HAVE NOTICED. OR THE OPPOSITE, BEING SO FIGETY OR RESTLESS THAT YOU HAVE BEEN MOVING AROUND A LOT MORE THAN USUAL: NOT AT ALL
1. LITTLE INTEREST OR PLEASURE IN DOING THINGS: SEVERAL DAYS
SUM OF ALL RESPONSES TO PHQ QUESTIONS 1-9: 11

## 2025-04-02 NOTE — PROGRESS NOTES
Subjective  Barbara Jacob, 24 y.o. female presents today with:  Chief Complaint   Patient presents with    Rash     Woke up with rash on left side of face this AM. States rash itches & is starting to spread towards her eye. States her nose, lips & ear is also burning.        History of Present Illness  The patient is a 24-year-old female who presents for evaluation of an acute rash.    She reports the onset of a mild rash upon awakening, which has since spread towards her eye. The rash is localized to the left side of her face and is associated with itching and a burning sensation. Additionally, intermittent itching is experienced in her lips, nose, and ears. She has no known allergies and has not introduced any new facial products into her regimen. Her daily skincare routine includes the use of Cetaphil moisturizer, which she has been using for several years. 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 Occasional scab formation on her calf is also mentioned. She has attempted self-treatment with Benadryl.    A previous visit a few months ago due to fatigue is recalled, during which a sleep study was recommended. However, the study has not yet been completed. Chronic tiredness continues to be an issue, with an episode of sleeping for 14 hours after a particularly busy week reported. Instances of sleepwalking, during which conversations with her boyfriend occur, are also noted.    Mood is reported as stable, and her depression score has improved compared to previous assessments. She is currently on medication for ADHD.    Acknowledgment of the need for a physical examination is made, as it has been approximately a year since her last one. Consideration is given to receiving her COVID-19 booster vaccine, but there is hesitation due to a previous adverse reaction to the vaccine, which resulted in a day of illness. She has no other concerns at this time.